# Patient Record
Sex: FEMALE | Race: WHITE | NOT HISPANIC OR LATINO | Employment: FULL TIME | URBAN - METROPOLITAN AREA
[De-identification: names, ages, dates, MRNs, and addresses within clinical notes are randomized per-mention and may not be internally consistent; named-entity substitution may affect disease eponyms.]

---

## 2020-02-04 ENCOUNTER — HOSPITAL ENCOUNTER (OUTPATIENT)
Dept: URGENT CARE | Facility: CLINIC | Age: 40
Discharge: HOME | End: 2020-02-04
Attending: PHYSICIAN ASSISTANT

## 2023-04-04 ENCOUNTER — OFFICE VISIT (OUTPATIENT)
Dept: INTERNAL MEDICINE | Facility: CLINIC | Age: 43
End: 2023-04-04
Payer: COMMERCIAL

## 2023-04-04 VITALS
OXYGEN SATURATION: 98 % | HEART RATE: 88 BPM | TEMPERATURE: 97.7 F | HEIGHT: 59 IN | WEIGHT: 164.6 LBS | SYSTOLIC BLOOD PRESSURE: 142 MMHG | BODY MASS INDEX: 33.18 KG/M2 | RESPIRATION RATE: 18 BRPM | DIASTOLIC BLOOD PRESSURE: 84 MMHG

## 2023-04-04 DIAGNOSIS — M77.12 EPICONDYLITIS, LATERAL, LEFT: ICD-10-CM

## 2023-04-04 DIAGNOSIS — E55.9 VITAMIN D DEFICIENCY: ICD-10-CM

## 2023-04-04 DIAGNOSIS — F41.9 ANXIETY: ICD-10-CM

## 2023-04-04 DIAGNOSIS — R20.0 BILATERAL HAND NUMBNESS: Primary | ICD-10-CM

## 2023-04-04 DIAGNOSIS — M50.30 DEGENERATIVE DISC DISEASE, CERVICAL: ICD-10-CM

## 2023-04-04 DIAGNOSIS — E66.9 OBESITY (BMI 30-39.9): ICD-10-CM

## 2023-04-04 DIAGNOSIS — R03.0 ELEVATED BLOOD PRESSURE READING: ICD-10-CM

## 2023-04-04 PROBLEM — M54.12 RADICULOPATHY OF CERVICAL REGION: Status: ACTIVE | Noted: 2023-04-04

## 2023-04-04 LAB
25(OH)D3 SERPL-MCNC: 13.2 NG/ML (ref 30–100)
ALBUMIN SERPL-MCNC: 4.5 G/DL (ref 3.5–5.2)
ALBUMIN/GLOB SERPL: 1.5 G/DL
ALP SERPL-CCNC: 83 U/L (ref 39–117)
ALT SERPL W P-5'-P-CCNC: 10 U/L (ref 1–33)
ANION GAP SERPL CALCULATED.3IONS-SCNC: 10 MMOL/L (ref 5–15)
AST SERPL-CCNC: 18 U/L (ref 1–32)
BASOPHILS # BLD AUTO: 0.04 10*3/MM3 (ref 0–0.2)
BASOPHILS NFR BLD AUTO: 0.5 % (ref 0–1.5)
BILIRUB SERPL-MCNC: 0.2 MG/DL (ref 0–1.2)
BUN SERPL-MCNC: 13 MG/DL (ref 6–20)
BUN/CREAT SERPL: 19.4 (ref 7–25)
CALCIUM SPEC-SCNC: 9.1 MG/DL (ref 8.6–10.5)
CHLORIDE SERPL-SCNC: 102 MMOL/L (ref 98–107)
CHOLEST SERPL-MCNC: 212 MG/DL (ref 0–200)
CO2 SERPL-SCNC: 26 MMOL/L (ref 22–29)
CREAT SERPL-MCNC: 0.67 MG/DL (ref 0.57–1)
DEPRECATED RDW RBC AUTO: 38.8 FL (ref 37–54)
EGFRCR SERPLBLD CKD-EPI 2021: 112.8 ML/MIN/1.73
EOSINOPHIL # BLD AUTO: 0.18 10*3/MM3 (ref 0–0.4)
EOSINOPHIL NFR BLD AUTO: 2.1 % (ref 0.3–6.2)
ERYTHROCYTE [DISTWIDTH] IN BLOOD BY AUTOMATED COUNT: 11.7 % (ref 12.3–15.4)
FOLATE SERPL-MCNC: 12.4 NG/ML (ref 4.78–24.2)
GLOBULIN UR ELPH-MCNC: 3 GM/DL
GLUCOSE SERPL-MCNC: 96 MG/DL (ref 65–99)
HCT VFR BLD AUTO: 38 % (ref 34–46.6)
HDLC SERPL-MCNC: 69 MG/DL (ref 40–60)
HGB BLD-MCNC: 12.9 G/DL (ref 12–15.9)
IMM GRANULOCYTES # BLD AUTO: 0.03 10*3/MM3 (ref 0–0.05)
IMM GRANULOCYTES NFR BLD AUTO: 0.3 % (ref 0–0.5)
LDLC SERPL CALC-MCNC: 131 MG/DL (ref 0–100)
LDLC/HDLC SERPL: 1.88 {RATIO}
LYMPHOCYTES # BLD AUTO: 2.68 10*3/MM3 (ref 0.7–3.1)
LYMPHOCYTES NFR BLD AUTO: 31.1 % (ref 19.6–45.3)
MCH RBC QN AUTO: 30.8 PG (ref 26.6–33)
MCHC RBC AUTO-ENTMCNC: 33.9 G/DL (ref 31.5–35.7)
MCV RBC AUTO: 90.7 FL (ref 79–97)
MONOCYTES # BLD AUTO: 0.61 10*3/MM3 (ref 0.1–0.9)
MONOCYTES NFR BLD AUTO: 7.1 % (ref 5–12)
NEUTROPHILS NFR BLD AUTO: 5.09 10*3/MM3 (ref 1.7–7)
NEUTROPHILS NFR BLD AUTO: 58.9 % (ref 42.7–76)
NRBC BLD AUTO-RTO: 0 /100 WBC (ref 0–0.2)
PLATELET # BLD AUTO: 281 10*3/MM3 (ref 140–450)
PMV BLD AUTO: 9.6 FL (ref 6–12)
POTASSIUM SERPL-SCNC: 4.5 MMOL/L (ref 3.5–5.2)
PROT SERPL-MCNC: 7.5 G/DL (ref 6–8.5)
RBC # BLD AUTO: 4.19 10*6/MM3 (ref 3.77–5.28)
SODIUM SERPL-SCNC: 138 MMOL/L (ref 136–145)
T3FREE SERPL-MCNC: 3.18 PG/ML (ref 2–4.4)
T4 FREE SERPL-MCNC: 1.21 NG/DL (ref 0.93–1.7)
TRIGL SERPL-MCNC: 67 MG/DL (ref 0–150)
TSH SERPL DL<=0.05 MIU/L-ACNC: 1.6 UIU/ML (ref 0.27–4.2)
VIT B12 BLD-MCNC: 447 PG/ML (ref 211–946)
VLDLC SERPL-MCNC: 12 MG/DL (ref 5–40)
WBC NRBC COR # BLD: 8.63 10*3/MM3 (ref 3.4–10.8)

## 2023-04-04 PROCEDURE — 80061 LIPID PANEL: CPT | Performed by: INTERNAL MEDICINE

## 2023-04-04 PROCEDURE — 82306 VITAMIN D 25 HYDROXY: CPT | Performed by: INTERNAL MEDICINE

## 2023-04-04 PROCEDURE — 84481 FREE ASSAY (FT-3): CPT | Performed by: INTERNAL MEDICINE

## 2023-04-04 PROCEDURE — 84439 ASSAY OF FREE THYROXINE: CPT | Performed by: INTERNAL MEDICINE

## 2023-04-04 PROCEDURE — 82607 VITAMIN B-12: CPT | Performed by: INTERNAL MEDICINE

## 2023-04-04 PROCEDURE — 82746 ASSAY OF FOLIC ACID SERUM: CPT | Performed by: INTERNAL MEDICINE

## 2023-04-04 PROCEDURE — 80050 GENERAL HEALTH PANEL: CPT | Performed by: INTERNAL MEDICINE

## 2023-04-04 NOTE — Clinical Note
Inform patient vitamin D levels are low.Low vitamin D can cause fatigue and usually obtained from sunlight.  Will start vitamin D tablets high-dose once a week for 2 months then take vitamin D3 2000 units daily.  Prescriptions were sent to her pharmacy

## 2023-04-04 NOTE — PROGRESS NOTES
CHIEF COMPLAINT  Edith Smalls presents to [facility] to Establish Care (42 year old female here today to establish care. States she was seen in Urgent Care due to elbow pain. States she was diagnosed with a bulging disc in her c spine. )     HPI  42-year-old female patient here to establish care.    Patient was seen at the urgent clinic March 10, 2023 for left elbow pain for the past 3 weeks prior to that encounter.-With pain going down the left arm described as tingling at times.-Diagnosis was of cervical radiculopathy and she was given prednisone 20 mg 2 tablets by mouth daily for 5 days along with Flexeril 10 mg 1 tablet 3 times a day as needed-patient is here to establish care and may need possible MRI/EMG nerve conduction study to evaluate.      Has had chronic numbness of both hand for 10 yrs-decreased stre  nght of both hands--elbow pain is new for past 3 weeks-works for [employer]- with repetitive movements of her hands-      X-rays at urgent clinic revealed moderate C6-7 degenerative disc disease on March 10, 2023, left elbow x-ray unremarkable.    PMH-DDD, with left arm pain-elev BP    SH-works for [employer]- with repetitive movements of her hands-since 10/2023--left the year prior-and worked for [employer]-,  -2 kids - has 3 kids and home off and on    Past History:  Allergies: Patient has no known allergies.   Medical History: has a past medical history of Anxiety, Bulging of cervical intervertebral disc, and Elbow pain, left.   Surgical History: has a past surgical history that includes  section.   Family History: family history is not on file.   Social History: reports that she quit smoking about 5 years ago. Her smoking use included cigarettes. She started smoking about 23 years ago. She has a 4.50 pack-year smoking history. She has never used smokeless tobacco. She reports that she does not currently use alcohol. She reports that she does not use  "drugs.  Social History     Social History Narrative    Lives with spouse    Eats at least 2 meals per day     Healthy sleep habits          Current Outpatient Medications:   •  ibuprofen (ADVIL,MOTRIN) 200 MG tablet, Take 1 tablet by mouth Every 6 (Six) Hours As Needed for Mild Pain., Disp: , Rfl:      OBJECTIVE  Vital Signs  Vitals:    04/04/23 1413   BP: 142/84   BP Location: Left arm   Patient Position: Sitting   Pulse: 88   Resp: 18   Temp: 97.7 °F (36.5 °C)   TempSrc: Temporal   SpO2: 98%   Weight: 74.7 kg (164 lb 9.6 oz)   Height: 149.9 cm (59\")      Body mass index is 33.25 kg/m².    Review of Systems -left arm paresthesias/radiculopathy    Physical Exam  Vitals and nursing note reviewed.   Constitutional:       Appearance: Normal appearance.   HENT:      Head: Normocephalic and atraumatic.   Cardiovascular:      Rate and Rhythm: Normal rate and regular rhythm.   Pulmonary:      Effort: Pulmonary effort is normal.      Breath sounds: Normal breath sounds.   Abdominal:      Palpations: Abdomen is soft.   Musculoskeletal:         General: Tenderness present.      Cervical back: Normal range of motion and neck supple.      Comments: Tender at left lateral epicondyle   Neurological:      General: No focal deficit present.      Mental Status: She is alert and oriented to person, place, and time.      Comments: +Tinel's sign         RESULTS REVIEW  No results found for: PROBNP, BNP          No results found for: TSH   No results found for: FREET4         XR Elbow 3+ View Left    Result Date: 3/10/2023    1. Negative study      Mo Portillo M.D.       Electronically Signed and Approved By: Mo Portillo M.D. on 3/10/2023 at 13:26             XR Spine Cervical 3 View    Result Date: 3/10/2023    1. Moderate C6-7 degenerative disc disease      Mo Portillo M.D.       Electronically Signed and Approved By: Mo Portillo M.D. on 3/10/2023 at 13:28                       ASSESSMENT & PLAN  Diagnoses and all orders for " this visit:    1. Bilateral hand numbness (Primary)  Comments:  Probable carpal tunnel syndrome use wrist splints nightly but needs further evaluation and we will do EMG NCV  Orders:  -     CBC w AUTO Differential; Future  -     Comprehensive metabolic panel; Future  -     Lipid panel; Future  -     TSH+Free T4; Future  -     T3, free; Future  -     Vitamin D 25 hydroxy; Future  -     Vitamin B12; Future  -     Ambulatory Referral to Neurology  -     EMG & Nerve Conduction Test; Future  -     Folate; Future  -     Folate  -     Vitamin B12  -     Vitamin D 25 hydroxy  -     T3, free  -     TSH+Free T4  -     Lipid panel  -     Comprehensive metabolic panel  -     CBC w AUTO Differential    2. 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 Degenerative disc disease, cervical  Comments:  no neck pain   Orders:  -     CBC w AUTO Differential; Future  -     Comprehensive metabolic panel; Future  -     Lipid panel; Future  -     TSH+Free T4; Future  -     T3, free; Future  -     Vitamin D 25 hydroxy; Future  -     Vitamin B12; Future  -     Ambulatory Referral to Neurology  -     EMG & Nerve Conduction Test; Future  -     Vitamin B12  -     Vitamin D 25 hydroxy  -     T3, free  -     TSH+Free T4  -     Lipid panel  -     Comprehensive metabolic panel  -     CBC w AUTO Differential    4.  Anxiety  Comments:  Declined medications at present-stable  Orders:  -     CBC w AUTO Differential; Future  -     Comprehensive metabolic panel; Future  -     Lipid panel; Future  -     TSH+Free T4; Future  -     T3, free; Future  -     Vitamin D 25 hydroxy; Future  -     Vitamin B12; Future  -     Ambulatory Referral to Neurology  -     EMG & Nerve Conduction Test; Future  -     Vitamin B12  -     Vitamin D 25 hydroxy  -     T3, free  -     TSH+Free T4  -     Lipid panel  -     Comprehensive metabolic panel  -     CBC w AUTO Differential    5. Elevated blood pressure reading  Comments:  Monitor blood pressure at home goal is less than 140/90 ideally 120/80 monitor BP and record bring in log at next visit-check lipids, TSH,cmp, tfts    6. Obesity (BMI 30-39.9)        BMI is >= 30 and <35. (Class 1 Obesity). The following options were offered after discussion;: weight loss educational material (shared in after visit summary), exercise counseling/recommendations and nutrition counseling/recommendations      Patient Instructions   Use. wrist splints every night  Decrease repetitive activities as much as possible  Do labs today  Refer to neurology for evaluation and EMG nerve conduction velocity test  Monitor blood pressure at home goal is less than 140/90 ideally 120/80 follow-up in 3 weeks with BP log     Addendum April 5, 2023  Labs reviewed and consistent with low vitamin D levels which may cause fatigue start high-dose vitamin D once a week for 2 months then take daily vitamin D3 2000 units    FOLLOW UP  Return in about 29 days (around 5/3/2023).  To check on carpal tunnel and left elbow epicondylitis and lab    Patient was given instructions and counseling regarding her condition or for health maintenance advice. Please see specific information pulled into the AVS if appropriate.

## 2023-04-04 NOTE — PATIENT INSTRUCTIONS
Use. wrist splints every night  Decrease repetitive activities as much as possible  Do labs today  Refer to neurology for evaluation and EMG nerve conduction velocity test  Monitor blood pressure at home goal is less than 140/90 ideally 120/80 follow-up in 3 weeks with BP log

## 2023-04-05 RX ORDER — ERGOCALCIFEROL 1.25 MG/1
50000 CAPSULE ORAL
Qty: 8 CAPSULE | Refills: 0 | Status: SHIPPED | OUTPATIENT
Start: 2023-04-05

## 2023-04-05 RX ORDER — MULTIVIT-MIN/IRON/FOLIC ACID/K 18-600-40
1 CAPSULE ORAL DAILY
Qty: 90 CAPSULE | Refills: 1 | Status: SHIPPED | OUTPATIENT
Start: 2023-04-05

## 2023-04-17 ENCOUNTER — TELEPHONE (OUTPATIENT)
Dept: INTERNAL MEDICINE | Facility: CLINIC | Age: 43
End: 2023-04-17
Payer: COMMERCIAL

## 2023-05-11 ENCOUNTER — OFFICE VISIT (OUTPATIENT)
Dept: INTERNAL MEDICINE | Facility: CLINIC | Age: 43
End: 2023-05-11
Payer: COMMERCIAL

## 2023-05-11 VITALS
DIASTOLIC BLOOD PRESSURE: 60 MMHG | WEIGHT: 166.2 LBS | OXYGEN SATURATION: 98 % | HEIGHT: 59 IN | SYSTOLIC BLOOD PRESSURE: 116 MMHG | BODY MASS INDEX: 33.51 KG/M2 | HEART RATE: 76 BPM | RESPIRATION RATE: 14 BRPM | TEMPERATURE: 97.8 F

## 2023-05-11 DIAGNOSIS — E66.9 OBESITY (BMI 30-39.9): ICD-10-CM

## 2023-05-11 DIAGNOSIS — G56.03 BILATERAL CARPAL TUNNEL SYNDROME: ICD-10-CM

## 2023-05-11 DIAGNOSIS — E55.9 VITAMIN D DEFICIENCY: ICD-10-CM

## 2023-05-11 DIAGNOSIS — K21.9 GASTROESOPHAGEAL REFLUX DISEASE WITHOUT ESOPHAGITIS: Primary | ICD-10-CM

## 2023-05-11 RX ORDER — SEMAGLUTIDE 0.5 MG/.5ML
0.5 INJECTION, SOLUTION SUBCUTANEOUS WEEKLY
Qty: 2 ML | Refills: 0 | Status: SHIPPED | OUTPATIENT
Start: 2023-05-11

## 2023-05-11 RX ORDER — SEMAGLUTIDE 0.25 MG/.5ML
0.25 INJECTION, SOLUTION SUBCUTANEOUS WEEKLY
Qty: 2 ML | Refills: 0 | Status: SHIPPED | OUTPATIENT
Start: 2023-05-11

## 2023-05-11 RX ORDER — ESOMEPRAZOLE MAGNESIUM 40 MG/1
40 CAPSULE, DELAYED RELEASE ORAL
Qty: 30 CAPSULE | Refills: 2 | Status: SHIPPED | OUTPATIENT
Start: 2023-05-11

## 2023-05-11 NOTE — PROGRESS NOTES
CHIEF COMPLAINT  Edith Smalls presents to [facility] for follow-up of labs results, Follow-up (41 yo female is here today for a follow up visit and to go over labs results. Patient is having acid reflux bad and has to buy nexium every 14 days. ), and Heartburn.    HPI  42-year-old patient here for follow-up of chronic problems and labs    Numbness better with wrist splints and elbows better-working at a different line-    Main concern is her reflux-had  relief with Nexium    Obesity-wants to lose weight needs meds    Patient Instructions on 4/4/2023   Use. wrist splints every night  Decrease repetitive activities as much as possible  Do labs today  Refer to neurology for evaluation and EMG nerve conduction velocity test  Monitor blood pressure at home goal is less than 140/90 ideally 120/80 follow-up in 3 weeks with BP log     Addendum April 5, 2023  Labs reviewed and consistent with low vitamin D levels which may cause fatigue start high-dose vitamin D once a week for 2 months then take daily vitamin D3 2000 units    For 4/4/23 visit note  Patient was seen at the urgent clinic March 10, 2023 for left elbow pain for the past 3 weeks prior to that encounter.-With pain going down the left arm described as tingling at times.-Diagnosis was of cervical radiculopathy and she was given prednisone 20 mg 2 tablets by mouth daily for 5 days along with Flexeril 10 mg 1 tablet 3 times a day as needed-patient is here to establish care and may need possible MRI/EMG nerve conduction study to evaluate.        Has had chronic numbness of both hand for 10 yrs-decreased stre  nght of both hands--elbow pain is new for past 3 weeks-works for [employer]- with repetitive movements of her hands-        X-rays at urgent clinic revealed moderate C6-7 degenerative disc disease on March 10, 2023, left elbow x-ray unremarkable.     PMH-DDD, with left arm pain-elev BP     SH-works for [employer]- with  "repetitive movements of her hands-since 10/2023--left the year prior-and worked for [employer]-,  2018-2 kids - has 3 kids and home off and on    Current Outpatient Medications:   •  ibuprofen (ADVIL,MOTRIN) 200 MG tablet, Take 1 tablet by mouth Every 6 (Six) Hours As Needed for Mild Pain., Disp: , Rfl:   •  vitamin D (ERGOCALCIFEROL) 1.25 MG (87298 UT) capsule capsule, Take 1 capsule by mouth Every 7 (Seven) Days. For 2 months then take vitamin D3 2000 units daily, Disp: 8 capsule, Rfl: 0  •  Vitamin D, Cholecalciferol, 50 MCG (2000 UT) capsule, Take 1 capsule by mouth Daily., Disp: 90 capsule, Rfl: 1  •  esomeprazole (nexIUM) 40 MG capsule, Take 1 capsule by mouth Every Morning Before Breakfast. For 2 months then every other day or as needed, Disp: 30 capsule, Rfl: 2  •  Semaglutide-Weight Management (Wegovy) 0.25 MG/0.5ML solution auto-injector, Inject 0.25 mg under the skin into the appropriate area as directed 1 (One) Time Per Week. For 4 weeks, Disp: 2 mL, Rfl: 0  •  Semaglutide-Weight Management (Wegovy) 0.5 MG/0.5ML solution auto-injector, Inject 0.5 mL under the skin into the appropriate area as directed 1 (One) Time Per Week. For 4 weeks-start after finished with the 0.25 mg dose, Disp: 2 mL, Rfl: 0   PFSH reviewed.      OBJECTIVE  Vital Signs  Vitals:    05/11/23 1543   BP: 116/60   BP Location: Left arm   Patient Position: Sitting   Cuff Size: Adult   Pulse: 76   Resp: 14   Temp: 97.8 °F (36.6 °C)   TempSrc: Skin   SpO2: 98%   Weight: 75.4 kg (166 lb 3.2 oz)   Height: 149.9 cm (59\")      Body mass index is 33.57 kg/m². PLQ=799 BMI=25    Physical Exam  Vitals and nursing note reviewed.   Constitutional:       Appearance: Normal appearance.   HENT:      Head: Normocephalic and atraumatic.   Cardiovascular:      Rate and Rhythm: Normal rate and regular rhythm.   Pulmonary:      Effort: Pulmonary effort is normal.      Breath sounds: Normal breath sounds.   Abdominal:      Palpations: Abdomen is " soft.   Musculoskeletal:      Cervical back: Normal range of motion and neck supple.   Neurological:      General: No focal deficit present.      Mental Status: She is alert and oriented to person, place, and time.          RESULTS REVIEW  No results found for: PROBNP, BNP  CMP        4/4/2023    15:16   CMP   Glucose 96     BUN 13     Creatinine 0.67     EGFR 112.8     Sodium 138     Potassium 4.5     Chloride 102     Calcium 9.1     Total Protein 7.5     Albumin 4.5     Globulin 3.0     Total Bilirubin 0.2     Alkaline Phosphatase 83     AST (SGOT) 18     ALT (SGPT) 10     Albumin/Globulin Ratio 1.5     BUN/Creatinine Ratio 19.4     Anion Gap 10.0       CBC w/diff        4/4/2023    15:16   CBC w/Diff   WBC 8.63     RBC 4.19     Hemoglobin 12.9     Hematocrit 38.0     MCV 90.7     MCH 30.8     MCHC 33.9     RDW 11.7     Platelets 281     Neutrophil Rel % 58.9     Immature Granulocyte Rel % 0.3     Lymphocyte Rel % 31.1     Monocyte Rel % 7.1     Eosinophil Rel % 2.1     Basophil Rel % 0.5        Lipid Panel        4/4/2023    15:16   Lipid Panel   Total Cholesterol 212     Triglycerides 67     HDL Cholesterol 69     VLDL Cholesterol 12     LDL Cholesterol  131     LDL/HDL Ratio 1.88        Lab Results   Component Value Date    TSH 1.600 04/04/2023      Lab Results   Component Value Date    FREET4 1.21 04/04/2023         Lab Results   Component Value Date    JOXQVIXL27 447 04/04/2023    YADM19BS 13.2 (L) 04/04/2023        XR Elbow 3+ View Left    Result Date: 3/10/2023    1. Negative study      Mo Portillo M.D.       Electronically Signed and Approved By: Mo Portillo M.D. on 3/10/2023 at 13:26             XR Spine Cervical 3 View    Result Date: 3/10/2023    1. Moderate C6-7 degenerative disc disease      Mo Portillo M.D.       Electronically Signed and Approved By: Mo Portillo M.D. on 3/10/2023 at 13:28                        ASSESSMENT & PLAN  Diagnoses and all orders for this visit:    1.  Gastroesophageal reflux disease without esophagitis (Primary)  -     esomeprazole (nexIUM) 40 MG capsule; Take 1 capsule by mouth Every Morning Before Breakfast. For 2 months then every other day or as needed  Dispense: 30 capsule; Refill: 2    2. Obesity (BMI 30-39.9)  Comments:  Cut back on fast foods-goes out daily-,down to one soda daily,cut down on candy-wants to lose weight  Assessment & Plan:  Discussed obesity and complications discussed diet and cutting back on fast foods from daily to once every 2 weeks or less.  Patient states she will cut down her to daily sodas and on candy.    Plan start Wegovy side effects discussed need to reach main dose of 2.4 mg subcu every week denies any history of medullary thyroid cancer or pancreatitis denies any alcohol use.  Cut back on portions increase exercise walk 30 minutes daily.  Ideal body weight is around 120 pounds BMI equals 25  Follow-up in 2 months or sooner if needed.    Orders:  -     Semaglutide-Weight Management (Wegovy) 0.25 MG/0.5ML solution auto-injector; Inject 0.25 mg under the skin into the appropriate area as directed 1 (One) Time Per Week. For 4 weeks  Dispense: 2 mL; Refill: 0  -     Semaglutide-Weight Management (Wegovy) 0.5 MG/0.5ML solution auto-injector; Inject 0.5 mL under the skin into the appropriate area as directed 1 (One) Time Per Week. For 4 weeks-start after finished with the 0.25 mg dose  Dispense: 2 mL; Refill: 0    3. Vitamin D deficiency  Comments:  Continue with high-dose vitamin D then daily vitamin D 2000 units check levels at next visit.    4. Bilateral carpal tunnel syndrome  Comments:  Better since she changed position at work.  Only needing wrist splints as needed has had decreased numbness since.  Does not feel needs neurology eval.    Patient Instructions   Prescription for Nexium 40 mg 1 tablet before sleeping given    REFlux precautions discussed  Use Nexium for about 2 months then use every other day and as needed  Try  Wegovy as directed-thanks discussed no history of pancreatitis or thyroid cancer    Check vitamin D levels at next visit.    BMI is >= 30 and <35. (Class 1 Obesity). The following options were offered after discussion;: weight loss educational material (shared in after visit summary), exercise counseling/recommendations and nutrition counseling/recommendations          FOLLOW UP  Return in about 2 months (around 7/11/2023).    Patient was given instructions and counseling regarding her condition or for health maintenance advice. Please see specific information pulled into the AVS if appropriate.

## 2023-05-11 NOTE — PATIENT INSTRUCTIONS
Prescription for Nexium 40 mg 1 tablet before sleeping given    REFlux precautions discussed  Use Nexium for about 2 months then use every other day and as needed  Try Wegovy as directed-thanks discussed no history of pancreatitis or thyroid cancer

## 2023-05-11 NOTE — ASSESSMENT & PLAN NOTE
Discussed obesity and complications discussed diet and cutting back on fast foods from daily to once every 2 weeks or less.  Patient states she will cut down her to daily sodas and on candy.    Plan start Wegovy side effects discussed need to reach main dose of 2.4 mg subcu every week denies any history of medullary thyroid cancer or pancreatitis denies any alcohol use.  Cut back on portions increase exercise walk 30 minutes daily.  Ideal body weight is around 120 pounds BMI equals 25  Follow-up in 2 months or sooner if needed.

## 2023-05-15 ENCOUNTER — TELEPHONE (OUTPATIENT)
Dept: INTERNAL MEDICINE | Facility: CLINIC | Age: 43
End: 2023-05-15
Payer: COMMERCIAL

## 2023-05-15 NOTE — TELEPHONE ENCOUNTER
I spoke with patient and she states that she was able to get a coupon that would allow her just to pay $25 for the Wegovy if she wants to pursue this as we had discussed earlier.  I had told her that if she wanted to we could give her the phentermine medication which is only for 3 months.  Patient prefers to try the Wegovy at this time.  Keep appointment as scheduled

## 2023-05-15 NOTE — TELEPHONE ENCOUNTER
Spoke with patient the phone who states she found a coupon for $25 online and wants to pursue the Wegovy as discussed.  Ointment as scheduled.

## 2023-05-17 ENCOUNTER — TELEPHONE (OUTPATIENT)
Dept: INTERNAL MEDICINE | Facility: CLINIC | Age: 43
End: 2023-05-17
Payer: COMMERCIAL

## 2023-05-24 ENCOUNTER — TELEPHONE (OUTPATIENT)
Dept: INTERNAL MEDICINE | Facility: CLINIC | Age: 43
End: 2023-05-24
Payer: COMMERCIAL

## 2023-05-24 DIAGNOSIS — E66.01 MORBID (SEVERE) OBESITY DUE TO EXCESS CALORIES: Primary | ICD-10-CM

## 2023-05-24 RX ORDER — PHENTERMINE HYDROCHLORIDE 37.5 MG/1
37.5 CAPSULE ORAL EVERY MORNING
Qty: 30 CAPSULE | Refills: 0 | Status: SHIPPED | OUTPATIENT
Start: 2023-05-24

## 2023-05-24 NOTE — TELEPHONE ENCOUNTER
Unable to get wegovy due to cost-interested in ozempic or phentemine--BMI=33-  Will give phentermine and see back in one month.

## 2024-03-07 ENCOUNTER — OFFICE VISIT (OUTPATIENT)
Dept: INTERNAL MEDICINE | Facility: CLINIC | Age: 44
End: 2024-03-07
Payer: COMMERCIAL

## 2024-03-07 VITALS
BODY MASS INDEX: 34.56 KG/M2 | DIASTOLIC BLOOD PRESSURE: 84 MMHG | WEIGHT: 171.4 LBS | HEIGHT: 59 IN | SYSTOLIC BLOOD PRESSURE: 125 MMHG | OXYGEN SATURATION: 98 % | TEMPERATURE: 98 F | HEART RATE: 99 BPM

## 2024-03-07 DIAGNOSIS — E66.9 OBESITY (BMI 30-39.9): ICD-10-CM

## 2024-03-07 DIAGNOSIS — I10 PRIMARY HYPERTENSION: ICD-10-CM

## 2024-03-07 DIAGNOSIS — K21.9 GASTROESOPHAGEAL REFLUX DISEASE WITHOUT ESOPHAGITIS: ICD-10-CM

## 2024-03-07 DIAGNOSIS — E55.9 VITAMIN D DEFICIENCY: ICD-10-CM

## 2024-03-07 DIAGNOSIS — G44.229 CHRONIC TENSION-TYPE HEADACHE, NOT INTRACTABLE: ICD-10-CM

## 2024-03-07 DIAGNOSIS — R10.10 PAIN OF UPPER ABDOMEN: ICD-10-CM

## 2024-03-07 LAB
BILIRUB BLD-MCNC: NEGATIVE MG/DL
CLARITY, POC: CLEAR
COLOR UR: YELLOW
EXPIRATION DATE: NORMAL
GLUCOSE UR STRIP-MCNC: NEGATIVE MG/DL
KETONES UR QL: NEGATIVE
LEUKOCYTE EST, POC: NEGATIVE
Lab: NORMAL
NITRITE UR-MCNC: NEGATIVE MG/ML
PH UR: 6 [PH] (ref 5–8)
PROT UR STRIP-MCNC: NEGATIVE MG/DL
RBC # UR STRIP: NEGATIVE /UL
SP GR UR: 1.02 (ref 1–1.03)
UROBILINOGEN UR QL: NORMAL

## 2024-03-07 PROCEDURE — 82607 VITAMIN B-12: CPT | Performed by: INTERNAL MEDICINE

## 2024-03-07 PROCEDURE — 82746 ASSAY OF FOLIC ACID SERUM: CPT | Performed by: INTERNAL MEDICINE

## 2024-03-07 PROCEDURE — 82306 VITAMIN D 25 HYDROXY: CPT | Performed by: INTERNAL MEDICINE

## 2024-03-07 PROCEDURE — 80050 GENERAL HEALTH PANEL: CPT | Performed by: INTERNAL MEDICINE

## 2024-03-07 PROCEDURE — 84439 ASSAY OF FREE THYROXINE: CPT | Performed by: INTERNAL MEDICINE

## 2024-03-07 PROCEDURE — 80061 LIPID PANEL: CPT | Performed by: INTERNAL MEDICINE

## 2024-03-07 PROCEDURE — 83735 ASSAY OF MAGNESIUM: CPT | Performed by: INTERNAL MEDICINE

## 2024-03-07 PROCEDURE — 83690 ASSAY OF LIPASE: CPT | Performed by: INTERNAL MEDICINE

## 2024-03-07 PROCEDURE — 84481 FREE ASSAY (FT-3): CPT | Performed by: INTERNAL MEDICINE

## 2024-03-07 PROCEDURE — 82150 ASSAY OF AMYLASE: CPT | Performed by: INTERNAL MEDICINE

## 2024-03-07 RX ORDER — PANTOPRAZOLE SODIUM 40 MG/1
40 TABLET, DELAYED RELEASE ORAL 2 TIMES DAILY
Qty: 60 TABLET | Refills: 0 | Status: SHIPPED | OUTPATIENT
Start: 2024-03-07

## 2024-03-07 NOTE — PATIENT INSTRUCTIONS
Abdominal pain may be from the pancreas gastritis-UA at clinic is negative for leukocytes and blood  Do labs , amylase lipase and check for H. pylori gastritis with urea breath test  Stop ibuprofen which could be worsening her abdominal pain  Changeto pantoprazole 40 mg twice a day stop Nexium  Start vitamin D 2000 units daily since very low in April 2023 was 13  Follow-up 2 weeks  If not better may need CT of the abdomen and GI consult  If symptoms worsen needs to go to the ER

## 2024-03-07 NOTE — ASSESSMENT & PLAN NOTE
Concerned about possible gastritis or pancreatitis or other abnormality.    Plan stop ibuprofen which could be worsening abdominal pain takes about 800 mg several times a week and at least 400 mg daily for years for headache  Changed to pantoprazole 40 mg twice a day  Check urea breath test for H. pylori gastritis and do labs check amylase and lipase and CBC    Try amitriptyline to help with her chronic headaches

## 2024-03-07 NOTE — PROGRESS NOTES
Answers submitted by the patient for this visit:  Primary Reason for Visit (Submitted on 2/29/2024)  What is the primary reason for your visit?: Abdominal Pain  Abdominal Pain Questionnaire (Submitted on 2/29/2024)  Chief Complaint: Abdominal pain  Chronicity: recurrent  Onset: 1 to 4 weeks ago  Onset quality: sudden  Frequency: daily  Episode duration: 3 Months  Progression since onset: unchanged  Pain location: LUQ, left flank  Pain - numeric: 7/10  Pain quality: burning  Radiates to: LUQ  anorexia: No  arthralgias: No  belching: No  constipation: No  diarrhea: No  dysuria: No  fever: No  flatus: No  frequency: No  hematochezia: No  hematuria: No  melena: No  myalgias: No  nausea: No  weight loss: No  vomiting: No  Aggravated by: being still, eating, movement, palpation  Relieved by: nothing  CHIEF COMPLAINT  Edith Smalls presents to [facility] for follow-up of Abdominal Pain (Pt is a 43 yr old female presenting to Internal Medicine for abdominal pain; Pt reports pain stated two weeks ago:pain atsrts in left upper quadrant, then will shoot through abdomen to the left mid upper back ; pain is a 9: pt reports she notices pain increases with activity; pt states pressure to area eases pain, and sitting eases pain; self treated with OTC Gut liner with mild relief /).    HPI    43-year-old patient with complaint of abdominal pain for the past 2 weeks --worse with eating,and lasts hours -rates pain at 9/10-last time was yesterday at work-started working out last year-- and restarted again 6 weeks ago  Using ibuprofen 200 mg  2x/week for HA-up to 400 mg for years   Former smoker - kilo 2018-1 PPD at most- since 2002    UA at clinic unremarkable with negative leukocytes negative nitrite and no blood.  GERD-was on PPI-not controlled-    Dad with pancreatitis-     Obesity -5 lb weight gain since last yr    Vitamin D deficiency-equals 13-- April 2023    Patient Instructions May 11, 2023    Prescription for Nexium 40 mg 1 tablet before sleeping given   REFlux precautions discussed  Use Nexium for about 2 months then use every other day and as needed  Try Wegovy as directed-SE discussed-no history of pancreatitis or thyroid cancer     Check vitamin D levels at next visit.    Last visit with me was May 11, 2023  here for follow-up of chronic problems and labs     Numbness better with wrist splints and elbows better-working at a different line-     Main concern is her reflux-had  relief with Nexium     Obesity-wants to lose weight needs meds     Patient Instructions on 4/4/2023   Use. wrist splints every night  Decrease repetitive activities as much as possible  Do labs today  Refer to neurology for evaluation and EMG nerve conduction velocity test  Monitor blood pressure at home goal is less than 140/90 ideally 120/80 follow-up in 3 weeks with BP log     Addendum April 5, 2023  Labs reviewed and consistent with low vitamin D levels which may cause fatigue start high-dose vitamin D once a week for 2 months then take daily vitamin D3 2000 units     For 4/4/23 visit note  Patient was seen at the urgent clinic March 10, 2023 for left elbow pain for the past 3 weeks prior to that encounter.-With pain going down the left arm described as tingling at times.-Diagnosis was of cervical radiculopathy and she was given prednisone 20 mg 2 tablets by mouth daily for 5 days along with Flexeril 10 mg 1 tablet 3 times a day as needed-patient is here to establish care and may need possible MRI/EMG nerve conduction study to evaluate.        Has had chronic numbness of both hand for 10 yrs-decreased stre  nght of both hands--elbow pain is new for past 3 weeks-works for [employer]- with repetitive movements of her hands-        X-rays at urgent clinic revealed moderate C6-7 degenerative disc disease on March 10, 2023, left elbow x-ray unremarkable.     PMH-DDD, with left arm pain-elev BP     SH-works for [employer]- with  "repetitive movements of her hands-since 10/2023--left the year prior-and worked for [employer]-,  2018-2 kids - has 3 kids and home off and on             Current Outpatient Medications:     ibuprofen (ADVIL,MOTRIN) 200 MG tablet, Take 1 tablet by mouth Every 6 (Six) Hours As Needed for Mild Pain., Disp: , Rfl:     pantoprazole (Protonix) 40 MG EC tablet, Take 1 tablet by mouth 2 (Two) Times a Day. Pt tolerating otc  nexium 40 mg daily which she is using, Disp: 60 tablet, Rfl: 0   PFSH reviewed.      OBJECTIVE  Vital Signs  Vitals:    03/07/24 1455 03/07/24 1500   BP: 149/98 125/84   BP Location: Left arm Left arm   Patient Position: Sitting Sitting   Cuff Size: Adult Adult   Pulse: 99    Temp: 98 °F (36.7 °C)    TempSrc: Infrared    SpO2: 98%    Weight: 77.7 kg (171 lb 6.4 oz)    Height: 149.9 cm (59.02\")       Body mass index is 34.6 kg/m².    Physical Exam  Vitals and nursing note reviewed.   Constitutional:       Appearance: Normal appearance.   HENT:      Head: Normocephalic and atraumatic.   Cardiovascular:      Rate and Rhythm: Normal rate and regular rhythm.   Pulmonary:      Effort: Pulmonary effort is normal.      Breath sounds: Normal breath sounds.   Abdominal:      Palpations: Abdomen is soft.   Musculoskeletal:      Cervical back: Normal range of motion and neck supple.   Neurological:      General: No focal deficit present.      Mental Status: She is alert and oriented to person, place, and time.          RESULTS REVIEW  No results found for: \"PROBNP\", \"BNP\"  CMP          4/4/2023    15:16   CMP   Glucose 96    BUN 13    Creatinine 0.67    EGFR 112.8    Sodium 138    Potassium 4.5    Chloride 102    Calcium 9.1    Total Protein 7.5    Albumin 4.5    Globulin 3.0    Total Bilirubin 0.2    Alkaline Phosphatase 83    AST (SGOT) 18    ALT (SGPT) 10    Albumin/Globulin Ratio 1.5    BUN/Creatinine Ratio 19.4    Anion Gap 10.0      CBC w/diff          4/4/2023    15:16   CBC w/Diff   WBC 8.63  "   RBC 4.19    Hemoglobin 12.9    Hematocrit 38.0    MCV 90.7    MCH 30.8    MCHC 33.9    RDW 11.7    Platelets 281    Neutrophil Rel % 58.9    Immature Granulocyte Rel % 0.3    Lymphocyte Rel % 31.1    Monocyte Rel % 7.1    Eosinophil Rel % 2.1    Basophil Rel % 0.5       Lipid Panel          4/4/2023    15:16   Lipid Panel   Total Cholesterol 212    Triglycerides 67    HDL Cholesterol 69    VLDL Cholesterol 12    LDL Cholesterol  131    LDL/HDL Ratio 1.88       Lab Results   Component Value Date    TSH 1.600 04/04/2023      Lab Results   Component Value Date    FREET4 1.21 04/04/2023         Lab Results   Component Value Date    [id number] 447 04/04/2023    XJCV38JF 13.2 (L) 04/04/2023        No Images in the past 120 days found..             ASSESSMENT & PLAN  Diagnoses and all orders for this visit:    1. Pain of upper abdomen (Primary)  Comments:  check labs-H pylori test-amylase and lipase-stop ibuprofen and exercises-made the CAT scan of the abdomen/EGD  Assessment & Plan:  Concerned about possible gastritis or pancreatitis or other abnormality.    Plan stop ibuprofen which could be worsening abdominal pain takes about 800 mg several times a week and at least 400 mg daily for years for headache  Changed to pantoprazole 40 mg twice a day  Check urea breath test for H. pylori gastritis and do labs check amylase and lipase and CBC    Try amitriptyline to help with her chronic headaches    Orders:  -     H. Pylori Breath Test - Breath, Lung; Future  -     Comprehensive Metabolic Panel; Future  -     Lipid Panel; Future  -     TSH+Free T4; Future  -     T3, Free; Future  -     Vitamin B12; Future  -     Folate; Future  -     Magnesium; Future  -     Vitamin D,25-Hydroxy; Future  -     CBC & Differential; Future  -     H. Pylori Breath Test - Breath, Lung; Future  -     Amylase; Future  -     Lipase; Future  -     Comprehensive Metabolic Panel  -     Lipid Panel  -     TSH+Free T4  -     T3, Free  -     Vitamin  B12  -     Folate  -     Magnesium  -     Vitamin D,25-Hydroxy  -     CBC & Differential  -     Amylase  -     Lipase    2. Primary hypertension  Comments:  not on meds but also with abdominal pain today-monito BP -goal < 130/80    3. Obesity (BMI 30-39.9)  Overview:  Cut back on fast foods-goes out daily-,down to one soda daily,cut down on candy-wants to lose weight    Orders:  -     H. Pylori Breath Test - Breath, Lung; Future  -     Comprehensive Metabolic Panel; Future  -     Lipid Panel; Future  -     TSH+Free T4; Future  -     T3, Free; Future  -     Vitamin B12; Future  -     Folate; Future  -     Magnesium; Future  -     Vitamin D,25-Hydroxy; Future  -     CBC & Differential; Future  -     H. Pylori Breath Test - Breath, Lung; Future  -     Amylase; Future  -     Lipase; Future  -     Comprehensive Metabolic Panel  -     Lipid Panel  -     TSH+Free T4  -     T3, Free  -     Vitamin B12  -     Folate  -     Magnesium  -     Vitamin D,25-Hydroxy  -     CBC & Differential  -     Amylase  -     Lipase    4. Gastroesophageal reflux disease without esophagitis  Comments:  Changed to pantoprazole 40 mg twice a day stop Nexium for now  Orders:  -     H. Pylori Breath Test - Breath, Lung; Future  -     Comprehensive Metabolic Panel; Future  -     Lipid Panel; Future  -     TSH+Free T4; Future  -     T3, Free; Future  -     Vitamin B12; Future  -     Folate; Future  -     Magnesium; Future  -     Vitamin D,25-Hydroxy; Future  -     CBC & Differential; Future  -     H. Pylori Breath Test - Breath, Lung; Future  -     Amylase; Future  -     Lipase; Future  -     pantoprazole (Protonix) 40 MG EC tablet; Take 1 tablet by mouth 2 (Two) Times a Day. Pt tolerating otc  nexium 40 mg daily which she is using  Dispense: 60 tablet; Refill: 0  -     Comprehensive Metabolic Panel  -     Lipid Panel  -     TSH+Free T4  -     T3, Free  -     Vitamin B12  -     Folate  -     Magnesium  -     Vitamin D,25-Hydroxy  -     CBC &  Differential  -     Amylase  -     Lipase    5. Vitamin D deficiency  -     H. Pylori Breath Test - Breath, Lung; Future  -     Comprehensive Metabolic Panel; Future  -     Lipid Panel; Future  -     TSH+Free T4; Future  -     T3, Free; Future  -     Vitamin B12; Future  -     Folate; Future  -     Magnesium; Future  -     Vitamin D,25-Hydroxy; Future  -     CBC & Differential; Future  -     H. Pylori Breath Test - Breath, Lung; Future  -     Amylase; Future  -     Lipase; Future  -     Comprehensive Metabolic Panel  -     Lipid Panel  -     TSH+Free T4  -     T3, Free  -     Vitamin B12  -     Folate  -     Magnesium  -     Vitamin D,25-Hydroxy  -     CBC & Differential  -     Amylase  -     Lipase    6. Chronic tension-type headache, not intractable  Comments:  exacerbated by neck pain-use tyl prn-avoid NSAIDS-pt has no problems sleeping-HA occurs 1-2x/week            Addendum 3/8/2024  Vitamin D levels very low equals 12 start high-dose vitamin D for 3 months then 2000 units daily    Patient Instructions   Abdominal pain may be from the pancreas gastritis-UA at clinic is negative for leukocytes and blood  Do labs , amylase lipase and check for H. pylori gastritis with urea breath test  Stop ibuprofen which could be worsening her abdominal pain  [place] pantoprazole 40 mg twice a day stop Nexium  Start vitamin D 2000 units daily since very low in April 2023 was 13  Follow-up 2 weeks  If not better may need CT of the abdomen and GI consult  If symptoms worsen needs to go to the ER     FOLLOW UP  Return in about 2 weeks (around 3/21/2024) for Annual physical, Recheck.    Patient was given instructions and counseling regarding her condition or for health maintenance advice. Please see specific information pulled into the AVS if appropriate.

## 2024-03-08 LAB
25(OH)D3 SERPL-MCNC: 12 NG/ML (ref 30–100)
ALBUMIN SERPL-MCNC: 4.3 G/DL (ref 3.5–5.2)
ALBUMIN/GLOB SERPL: 1.3 G/DL
ALP SERPL-CCNC: 77 U/L (ref 39–117)
ALT SERPL W P-5'-P-CCNC: 7 U/L (ref 1–33)
AMYLASE SERPL-CCNC: 36 U/L (ref 28–100)
ANION GAP SERPL CALCULATED.3IONS-SCNC: 11.8 MMOL/L (ref 5–15)
AST SERPL-CCNC: 19 U/L (ref 1–32)
BASOPHILS # BLD AUTO: 0.04 10*3/MM3 (ref 0–0.2)
BASOPHILS NFR BLD AUTO: 0.5 % (ref 0–1.5)
BILIRUB SERPL-MCNC: <0.2 MG/DL (ref 0–1.2)
BUN SERPL-MCNC: 14 MG/DL (ref 6–20)
BUN/CREAT SERPL: 18.7 (ref 7–25)
CALCIUM SPEC-SCNC: 9.2 MG/DL (ref 8.6–10.5)
CHLORIDE SERPL-SCNC: 102 MMOL/L (ref 98–107)
CHOLEST SERPL-MCNC: 205 MG/DL (ref 0–200)
CO2 SERPL-SCNC: 24.2 MMOL/L (ref 22–29)
CREAT SERPL-MCNC: 0.75 MG/DL (ref 0.57–1)
DEPRECATED RDW RBC AUTO: 38.8 FL (ref 37–54)
EGFRCR SERPLBLD CKD-EPI 2021: 102.1 ML/MIN/1.73
EOSINOPHIL # BLD AUTO: 0.28 10*3/MM3 (ref 0–0.4)
EOSINOPHIL NFR BLD AUTO: 3.2 % (ref 0.3–6.2)
ERYTHROCYTE [DISTWIDTH] IN BLOOD BY AUTOMATED COUNT: 11.9 % (ref 12.3–15.4)
FOLATE SERPL-MCNC: 10.6 NG/ML (ref 4.78–24.2)
GLOBULIN UR ELPH-MCNC: 3.2 GM/DL
GLUCOSE SERPL-MCNC: 149 MG/DL (ref 65–99)
HCT VFR BLD AUTO: 39.4 % (ref 34–46.6)
HDLC SERPL-MCNC: 53 MG/DL (ref 40–60)
HGB BLD-MCNC: 12.9 G/DL (ref 12–15.9)
IMM GRANULOCYTES # BLD AUTO: 0.02 10*3/MM3 (ref 0–0.05)
IMM GRANULOCYTES NFR BLD AUTO: 0.2 % (ref 0–0.5)
LDLC SERPL CALC-MCNC: 130 MG/DL (ref 0–100)
LDLC/HDLC SERPL: 2.4 {RATIO}
LIPASE SERPL-CCNC: 24 U/L (ref 13–60)
LYMPHOCYTES # BLD AUTO: 2.94 10*3/MM3 (ref 0.7–3.1)
LYMPHOCYTES NFR BLD AUTO: 33.4 % (ref 19.6–45.3)
MAGNESIUM SERPL-MCNC: 2.3 MG/DL (ref 1.6–2.6)
MCH RBC QN AUTO: 29.6 PG (ref 26.6–33)
MCHC RBC AUTO-ENTMCNC: 32.7 G/DL (ref 31.5–35.7)
MCV RBC AUTO: 90.4 FL (ref 79–97)
MONOCYTES # BLD AUTO: 0.63 10*3/MM3 (ref 0.1–0.9)
MONOCYTES NFR BLD AUTO: 7.2 % (ref 5–12)
NEUTROPHILS NFR BLD AUTO: 4.88 10*3/MM3 (ref 1.7–7)
NEUTROPHILS NFR BLD AUTO: 55.5 % (ref 42.7–76)
NRBC BLD AUTO-RTO: 0 /100 WBC (ref 0–0.2)
PLATELET # BLD AUTO: 328 10*3/MM3 (ref 140–450)
PMV BLD AUTO: 9.7 FL (ref 6–12)
POTASSIUM SERPL-SCNC: 4.3 MMOL/L (ref 3.5–5.2)
PROT SERPL-MCNC: 7.5 G/DL (ref 6–8.5)
RBC # BLD AUTO: 4.36 10*6/MM3 (ref 3.77–5.28)
SODIUM SERPL-SCNC: 138 MMOL/L (ref 136–145)
T3FREE SERPL-MCNC: 3.01 PG/ML (ref 2–4.4)
T4 FREE SERPL-MCNC: 1.19 NG/DL (ref 0.93–1.7)
TRIGL SERPL-MCNC: 124 MG/DL (ref 0–150)
TSH SERPL DL<=0.05 MIU/L-ACNC: 1.79 UIU/ML (ref 0.27–4.2)
VIT B12 BLD-MCNC: 480 PG/ML (ref 211–946)
VLDLC SERPL-MCNC: 22 MG/DL (ref 5–40)
WBC NRBC COR # BLD AUTO: 8.79 10*3/MM3 (ref 3.4–10.8)

## 2024-03-08 RX ORDER — MULTIVIT-MIN/IRON/FOLIC ACID/K 18-600-40
CAPSULE ORAL
Qty: 90 CAPSULE | Refills: 1 | Status: SHIPPED | OUTPATIENT
Start: 2024-03-08

## 2024-03-08 RX ORDER — ERGOCALCIFEROL 1.25 MG/1
50000 CAPSULE ORAL WEEKLY
Qty: 12 CAPSULE | Refills: 0 | Status: SHIPPED | OUTPATIENT
Start: 2024-03-08

## 2024-03-21 ENCOUNTER — OFFICE VISIT (OUTPATIENT)
Dept: INTERNAL MEDICINE | Age: 44
End: 2024-03-21
Payer: COMMERCIAL

## 2024-03-21 VITALS
HEIGHT: 59 IN | HEART RATE: 87 BPM | OXYGEN SATURATION: 98 % | DIASTOLIC BLOOD PRESSURE: 81 MMHG | SYSTOLIC BLOOD PRESSURE: 130 MMHG | WEIGHT: 174.4 LBS | BODY MASS INDEX: 35.16 KG/M2 | TEMPERATURE: 98 F

## 2024-03-21 DIAGNOSIS — Z00.00 ROUTINE HISTORY AND PHYSICAL EXAMINATION OF ADULT: Primary | ICD-10-CM

## 2024-03-21 DIAGNOSIS — K21.9 GASTROESOPHAGEAL REFLUX DISEASE WITHOUT ESOPHAGITIS: ICD-10-CM

## 2024-03-21 DIAGNOSIS — I10 PRIMARY HYPERTENSION: ICD-10-CM

## 2024-03-21 DIAGNOSIS — Z12.4 SCREENING FOR CERVICAL CANCER: ICD-10-CM

## 2024-03-21 DIAGNOSIS — E55.9 VITAMIN D DEFICIENCY: ICD-10-CM

## 2024-03-21 DIAGNOSIS — E66.01 MORBID OBESITY DUE TO EXCESS CALORIES: ICD-10-CM

## 2024-03-21 DIAGNOSIS — Z12.31 SCREENING MAMMOGRAM FOR BREAST CANCER: ICD-10-CM

## 2024-03-21 NOTE — ASSESSMENT & PLAN NOTE
Blood pressure elevated ideal is 120/80 goal is less than 130/80    Plan-follow a low-salt diet lose 5 to 10 pounds by next visit exercise and walk at least 30 minutes daily continue with lifestyle changes if can get stepdown for weight loss would benefit with her underlying high blood pressure.

## 2024-03-21 NOTE — PROGRESS NOTES
CHIEF COMPLAINT  Edith Smalls presents to [facility] INTER[facility] MEDICINE for follow-up of Annual Exam (Pt is a 43 yr old female presenting to Internal Medicine for an annual physical; Pt reports she would like to discuss weight loss medication options. ).    HPI    43-year-old patient here for physical and weight loss medication options    Records reviewed, meds reviewed in detail, labs reviewed with patient.  Plan of care is as follows below.    Cholesterol with  HDL 53 and cholesterol 205 triglycerides is 124, liver function tests are normal sugar was 149  Vitamin D low at 12      Abd pain resolved with PPI and off ibuprofen    Obesity- on zepbound and losing weight  Wants to try -unable to lose weight on own    Addendum 3/8/2024  Vitamin D levels very low equals 12 start high-dose vitamin D for 3 months then 2000 units daily     Patient Instructions March 7, 2024   Abdominal pain may be from the pancreas gastritis-UA at clinic is negative for leukocytes and blood  Do labs , amylase lipase and check for H. pylori gastritis with urea breath test  Stop ibuprofen which could be worsening her abdominal pain  Changeto pantoprazole 40 mg twice a day stop Nexium  Start vitamin D 2000 units daily since very low in April 2023 was 13  Follow-up 2 weeks  If not better may need CT of the abdomen and GI consult  If symptoms worsen needs to go to the ER     March 7, 2024 visit  patient with complaint of abdominal pain for the past 2 weeks --worse with eating,and lasts hours -rates pain at 9/10-last time was yesterday at work-started working out last year-- and restarted again 6 weeks ago  Using ibuprofen 200 mg  2x/week for HA-up to 400 mg for years   Former smoker - kilo 2018-1 PPD at most- since 2002     UA at clinic unremarkable with negative leukocytes negative nitrite and no blood.  GERD-was on PPI-not controlled-     Dad with pancreatitis-      Obesity -5 lb weight gain since last yr      Vitamin D deficiency-equals 13-- April 2023     Patient Instructions May 11, 2023   Prescription for Nexium 40 mg 1 tablet before sleeping given   REFlux precautions discussed  Use Nexium for about 2 months then use every other day and as needed  Try Wegovy as directed-SE discussed-no history of pancreatitis or thyroid cancer     Check vitamin D levels at next visit.     Last visit with me was May 11, 2023  here for follow-up of chronic problems and labs     Numbness better with wrist splints and elbows better-working at a different line-     Main concern is her reflux-had  relief with Nexium     Obesity-wants to lose weight needs meds     Patient Instructions on 4/4/2023   Use. wrist splints every night  Decrease repetitive activities as much as possible  Do labs today  Refer to neurology for evaluation and EMG nerve conduction velocity test  Monitor blood pressure at home goal is less than 140/90 ideally 120/80 follow-up in 3 weeks with BP log     Addendum April 5, 2023  Labs reviewed and consistent with low vitamin D levels which may cause fatigue start high-dose vitamin D once a week for 2 months then take daily vitamin D3 2000 units     For 4/4/23 visit note  Patient was seen at the urgent clinic March 10, 2023 for left elbow pain for the past 3 weeks prior to that encounter.-With pain going down the left arm described as tingling at times.-Diagnosis was of cervical radiculopathy and she was given prednisone 20 mg 2 tablets by mouth daily for 5 days along with Flexeril 10 mg 1 tablet 3 times a day as needed-patient is here to establish care and may need possible MRI/EMG nerve conduction study to evaluate.        Has had chronic numbness of both hand for 10 yrs-decreased stre  nght of both hands--elbow pain is new for past 3 weeks-works for [employer]- with repetitive movements of her hands-        X-rays at urgent clinic revealed moderate C6-7 degenerative disc disease on March 10, 2023, left elbow x-ray  "unremarkable.     PMH-DDD, with left arm pain-elev BP     SH-works for [employer]- with repetitive movements of her hands-since 10/2023--left the year prior-and worked for [employer]-,  2018-2 kids - has 3 kids and home off and on                 Current Outpatient Medications:     Cholecalciferol (Vitamin D) 50 MCG (2000 UT) capsule, One caps po daily, Disp: 90 capsule, Rfl: 1    ibuprofen (ADVIL,MOTRIN) 200 MG tablet, Take 1 tablet by mouth Every 6 (Six) Hours As Needed for Mild Pain., Disp: , Rfl:     pantoprazole (Protonix) 40 MG EC tablet, Take 1 tablet by mouth 2 (Two) Times a Day. Pt tolerating otc  nexium 40 mg daily which she is using, Disp: 60 tablet, Rfl: 0    vitamin D (ERGOCALCIFEROL) 1.25 MG (11573 UT) capsule capsule, Take 1 capsule by mouth 1 (One) Time Per Week., Disp: 12 capsule, Rfl: 0    Tirzepatide-Weight Management (ZEPBOUND) 2.5 MG/0.5ML solution auto-injector, Inject 0.5 mL under the skin into the appropriate area as directed 1 (One) Time Per Week., Disp: 2 mL, Rfl: 0    [START ON 4/21/2024] Tirzepatide-Weight Management (ZEPBOUND) 5 MG/0.5ML solution auto-injector, Inject 0.5 mL under the skin into the appropriate area as directed 1 (One) Time Per Week., Disp: 2 mL, Rfl: 1   PFSH reviewed.      OBJECTIVE  Vital Signs  Vitals:    03/21/24 1439   BP: 130/81   BP Location: Left arm   Patient Position: Sitting   Cuff Size: Adult   Pulse: 87   Temp: 98 °F (36.7 °C)   TempSrc: Infrared   SpO2: 98%   Weight: 79.1 kg (174 lb 6.4 oz)   Height: 149.9 cm (59.02\")      Body mass index is 35.21 kg/m².    Physical Exam  Vitals and nursing note reviewed.   Constitutional:       Appearance: Normal appearance. She is obese.   HENT:      Head: Normocephalic and atraumatic.   Cardiovascular:      Rate and Rhythm: Normal rate and regular rhythm.   Pulmonary:      Effort: Pulmonary effort is normal.      Breath sounds: Normal breath sounds.   Abdominal:      Palpations: Abdomen is soft.   Musculoskeletal: " "     Cervical back: Normal range of motion and neck supple.   Neurological:      General: No focal deficit present.      Mental Status: She is alert and oriented to person, place, and time.          RESULTS REVIEW  No results found for: \"PROBNP\", \"BNP\"  CMP          4/4/2023    15:16 3/7/2024    15:39   CMP   Glucose 96  149    BUN 13  14    Creatinine 0.67  0.75    EGFR 112.8  102.1    Sodium 138  138    Potassium 4.5  4.3    Chloride 102  102    Calcium 9.1  9.2    Total Protein 7.5  7.5    Albumin 4.5  4.3    Globulin 3.0  3.2    Total Bilirubin 0.2  <0.2    Alkaline Phosphatase 83  77    AST (SGOT) 18  19    ALT (SGPT) 10  7    Albumin/Globulin Ratio 1.5  1.3    BUN/Creatinine Ratio 19.4  18.7    Anion Gap 10.0  11.8      CBC w/diff          4/4/2023    15:16 3/7/2024    15:39   CBC w/Diff   WBC 8.63  8.79    RBC 4.19  4.36    Hemoglobin 12.9  12.9    Hematocrit 38.0  39.4    MCV 90.7  90.4    MCH 30.8  29.6    MCHC 33.9  32.7    RDW 11.7  11.9    Platelets 281  328    Neutrophil Rel % 58.9  55.5    Immature Granulocyte Rel % 0.3  0.2    Lymphocyte Rel % 31.1  33.4    Monocyte Rel % 7.1  7.2    Eosinophil Rel % 2.1  3.2    Basophil Rel % 0.5  0.5       Lipid Panel          4/4/2023    15:16 3/7/2024    15:39   Lipid Panel   Total Cholesterol 212  205    Triglycerides 67  124    HDL Cholesterol 69  53    VLDL Cholesterol 12  22    LDL Cholesterol  131  130    LDL/HDL Ratio 1.88  2.40       Lab Results   Component Value Date    TSH 1.790 03/07/2024    TSH 1.600 04/04/2023      Lab Results   Component Value Date    FREET4 1.19 03/07/2024    FREET4 1.21 04/04/2023         Lab Results   Component Value Date    SCRPGXZX43 480 03/07/2024    SJAQ50OC 12.0 (L) 03/07/2024    MG 2.3 03/07/2024        No Images in the past 120 days found..             ASSESSMENT & PLAN  Diagnoses and all orders for this visit:    1. Routine history and physical examination of adult (Primary)  Assessment & Plan:  Ages 40 to 64 " Counseling/Anticipatory Guidance Discussed: nutrition, physical activity, healthy weight, injury prevention, misuse of tobacco, alcohol and drugs, dental health, mental health, immunizations, screenings, self-breast exam, and use of seat belts.    Orders:  -     Mammo Screening Digital Tomosynthesis Bilateral With CAD; Future  -     Ambulatory Referral to Obstetrics / Gynecology  -     Tirzepatide-Weight Management (ZEPBOUND) 2.5 MG/0.5ML solution auto-injector; Inject 0.5 mL under the skin into the appropriate area as directed 1 (One) Time Per Week.  Dispense: 2 mL; Refill: 0  -     Tirzepatide-Weight Management (ZEPBOUND) 5 MG/0.5ML solution auto-injector; Inject 0.5 mL under the skin into the appropriate area as directed 1 (One) Time Per Week.  Dispense: 2 mL; Refill: 1  -     Comprehensive Metabolic Panel; Future  -     Lipid Panel; Future  -     CBC & Differential; Future  -     Vitamin B12; Future  -     Folate; Future  -     Vitamin D,25-Hydroxy; Future  -     Magnesium; Future  -     Microalbumin / Creatinine Urine Ratio - Urine, Clean Catch; Future  -     Hemoglobin A1c; Future  -     TSH+Free T4; Future  -     T3, Free; Future    2. Vitamin D deficiency  Comments:  take high dose [id number]  he vit D 2000 u daily -recheck at next visit  Orders:  -     Tirzepatide-Weight Management (ZEPBOUND) 2.5 MG/0.5ML solution auto-injector; Inject 0.5 mL under the skin into the appropriate area as directed 1 (One) Time Per Week.  Dispense: 2 mL; Refill: 0  -     Tirzepatide-Weight Management (ZEPBOUND) 5 MG/0.5ML solution auto-injector; Inject 0.5 mL under the skin into the appropriate area as directed 1 (One) Time Per Week.  Dispense: 2 mL; Refill: 1  -     Comprehensive Metabolic Panel; Future  -     Lipid Panel; Future  -     CBC & Differential; Future  -     Vitamin B12; Future  -     Folate; Future  -     Vitamin D,25-Hydroxy; Future  -     Magnesium; Future  -     Microalbumin / Creatinine Urine Ratio - Urine,  Clean Catch; Future  -     Hemoglobin A1c; Future  -     TSH+Free T4; Future  -     T3, Free; Future    3. Gastroesophageal reflux disease without esophagitis  Comments:  Take PPI  40mg BID for one month then one daily for 2-3 weeks then prn-Reflux precautions-acoid caffeine, sodas, sonja, licorice-spicy foods-keep HOB up  Orders:  -     Tirzepatide-Weight Management (ZEPBOUND) 2.5 MG/0.5ML solution auto-injector; Inject 0.5 mL under the skin into the appropriate area as directed 1 (One) Time Per Week.  Dispense: 2 mL; Refill: 0  -     Tirzepatide-Weight Management (ZEPBOUND) 5 MG/0.5ML solution auto-injector; Inject 0.5 mL under the skin into the appropriate area as directed 1 (One) Time Per Week.  Dispense: 2 mL; Refill: 1  -     Comprehensive Metabolic Panel; Future  -     Lipid Panel; Future  -     CBC & Differential; Future  -     Vitamin B12; Future  -     Folate; Future  -     Vitamin D,25-Hydroxy; Future  -     Magnesium; Future  -     Microalbumin / Creatinine Urine Ratio - Urine, Clean Catch; Future  -     Hemoglobin A1c; Future  -     TSH+Free T4; Future  -     T3, Free; Future    4. Morbid obesity due to excess calories  Assessment & Plan:  Patient's (Body mass index is 35.21 kg/m².) indicates that they are morbidly/severely obese (BMI > 40 or > 35 with obesity - related health condition) with health conditions that include hypertension, dyslipidemias, and osteoarthritis . Weight is worsening. BMI  is above average; BMI management plan is completed. We discussed low calorie, low carb based diet program, portion control, increasing exercise, joining a fitness center or start home based exercise program, Weight Watchers or other Commercial based weight reduction program, and pharmacologic options including try zepbound .     Orders:  -     Tirzepatide-Weight Management (ZEPBOUND) 2.5 MG/0.5ML solution auto-injector; Inject 0.5 mL under the skin into the appropriate area as directed 1 (One) Time Per Week.   Dispense: 2 mL; Refill: 0  -     Tirzepatide-Weight Management (ZEPBOUND) 5 MG/0.5ML solution auto-injector; Inject 0.5 mL under the skin into the appropriate area as directed 1 (One) Time Per Week.  Dispense: 2 mL; Refill: 1  -     Comprehensive Metabolic Panel; Future  -     Lipid Panel; Future  -     CBC & Differential; Future  -     Vitamin B12; Future  -     Folate; Future  -     Vitamin D,25-Hydroxy; Future  -     Magnesium; Future  -     Microalbumin / Creatinine Urine Ratio - Urine, Clean Catch; Future  -     Hemoglobin A1c; Future  -     TSH+Free T4; Future  -     T3, Free; Future    5. Primary hypertension  Assessment & Plan:  Blood pressure elevated ideal is 120/80 goal is less than 130/80    Plan-follow a low-salt diet lose 5 to 10 pounds by next visit exercise and walk at least 30 minutes daily continue with lifestyle changes if can get stepdown for weight loss would benefit with her underlying high blood pressure.    Orders:  -     Tirzepatide-Weight Management (ZEPBOUND) 2.5 MG/0.5ML solution auto-injector; Inject 0.5 mL under the skin into the appropriate area as directed 1 (One) Time Per Week.  Dispense: 2 mL; Refill: 0  -     Tirzepatide-Weight Management (ZEPBOUND) 5 MG/0.5ML solution auto-injector; Inject 0.5 mL under the skin into the appropriate area as directed 1 (One) Time Per Week.  Dispense: 2 mL; Refill: 1  -     Comprehensive Metabolic Panel; Future  -     Lipid Panel; Future  -     CBC & Differential; Future  -     Vitamin B12; Future  -     Folate; Future  -     Vitamin D,25-Hydroxy; Future  -     Magnesium; Future  -     Microalbumin / Creatinine Urine Ratio - Urine, Clean Catch; Future  -     Hemoglobin A1c; Future  -     TSH+Free T4; Future  -     T3, Free; Future    6. Screening mammogram for breast cancer  -     Mammo Screening Digital Tomosynthesis Bilateral With CAD; Future  -     Tirzepatide-Weight Management (ZEPBOUND) 2.5 MG/0.5ML solution auto-injector; Inject 0.5 mL under  the skin into the appropriate area as directed 1 (One) Time Per Week.  Dispense: 2 mL; Refill: 0  -     Tirzepatide-Weight Management (ZEPBOUND) 5 MG/0.5ML solution auto-injector; Inject 0.5 mL under the skin into the appropriate area as directed 1 (One) Time Per Week.  Dispense: 2 mL; Refill: 1  -     Comprehensive Metabolic Panel; Future  -     Lipid Panel; Future  -     CBC & Differential; Future  -     Vitamin B12; Future  -     Folate; Future  -     Vitamin D,25-Hydroxy; Future  -     Magnesium; Future  -     Microalbumin / Creatinine Urine Ratio - Urine, Clean Catch; Future  -     Hemoglobin A1c; Future  -     TSH+Free T4; Future  -     T3, Free; Future    7. Screening for cervical cancer  -     Ambulatory Referral to Obstetrics / Gynecology  -     Tirzepatide-Weight Management (ZEPBOUND) 2.5 MG/0.5ML solution auto-injector; Inject 0.5 mL under the skin into the appropriate area as directed 1 (One) Time Per Week.  Dispense: 2 mL; Refill: 0  -     Tirzepatide-Weight Management (ZEPBOUND) 5 MG/0.5ML solution auto-injector; Inject 0.5 mL under the skin into the appropriate area as directed 1 (One) Time Per Week.  Dispense: 2 mL; Refill: 1  -     Comprehensive Metabolic Panel; Future  -     Lipid Panel; Future  -     CBC & Differential; Future  -     Vitamin B12; Future  -     Folate; Future  -     Vitamin D,25-Hydroxy; Future  -     Magnesium; Future  -     Microalbumin / Creatinine Urine Ratio - Urine, Clean Catch; Future  -     Hemoglobin A1c; Future  -     TSH+Free T4; Future  -     T3, Free; Future    8. BMI 35.0-35.9,adult  -     Tirzepatide-Weight Management (ZEPBOUND) 2.5 MG/0.5ML solution auto-injector; Inject 0.5 mL under the skin into the appropriate area as directed 1 (One) Time Per Week.  Dispense: 2 mL; Refill: 0  -     Tirzepatide-Weight Management (ZEPBOUND) 5 MG/0.5ML solution auto-injector; Inject 0.5 mL under the skin into the appropriate area as directed 1 (One) Time Per Week.  Dispense: 2 mL;  Refill: 1  -     Comprehensive Metabolic Panel; Future  -     Lipid Panel; Future  -     CBC & Differential; Future  -     Vitamin B12; Future  -     Folate; Future  -     Vitamin D,25-Hydroxy; Future  -     Magnesium; Future  -     Microalbumin / Creatinine Urine Ratio - Urine, Clean Catch; Future  -     Hemoglobin A1c; Future  -     TSH+Free T4; Future  -     T3, Free; Future           Patient Instructions   Try zepbound  Do labs in 3 months then f/u   Low carb diet  Needs mamm and pap     FOLLOW UP  Return in about 3 months (around 6/21/2024), or if symptoms worsen or fail to improve, for Recheck.    Patient was given instructions and counseling regarding her condition or for health maintenance advice. Please see specific information pulled into the AVS if appropriate.

## 2024-03-21 NOTE — ASSESSMENT & PLAN NOTE
Patient's (Body mass index is 35.21 kg/m².) indicates that they are morbidly/severely obese (BMI > 40 or > 35 with obesity - related health condition) with health conditions that include hypertension, dyslipidemias, and osteoarthritis . Weight is worsening. BMI  is above average; BMI management plan is completed. We discussed low calorie, low carb based diet program, portion control, increasing exercise, joining a fitness center or start home based exercise program, Weight Watchers or other Commercial based weight reduction program, and pharmacologic options including try zepbound .

## 2024-03-21 NOTE — ASSESSMENT & PLAN NOTE
Ages 40 to 64 Counseling/Anticipatory Guidance Discussed: nutrition, physical activity, healthy weight, injury prevention, misuse of tobacco, alcohol and drugs, dental health, mental health, immunizations, screenings, self-breast exam, and use of seat belts.

## 2024-04-01 ENCOUNTER — TELEMEDICINE (OUTPATIENT)
Dept: INTERNAL MEDICINE | Age: 44
End: 2024-04-01
Payer: COMMERCIAL

## 2024-04-01 VITALS
SYSTOLIC BLOOD PRESSURE: 112 MMHG | HEART RATE: 72 BPM | DIASTOLIC BLOOD PRESSURE: 84 MMHG | WEIGHT: 174 LBS | TEMPERATURE: 97.7 F | BODY MASS INDEX: 35.08 KG/M2 | HEIGHT: 59 IN

## 2024-04-01 DIAGNOSIS — E66.01 MORBID (SEVERE) OBESITY DUE TO EXCESS CALORIES: Primary | ICD-10-CM

## 2024-04-01 NOTE — ASSESSMENT & PLAN NOTE
Patient's (Body mass index is 35.12 kg/m².) indicates that they are morbidly/severely obese (BMI > 40 or > 35 with obesity - related health condition) with health conditions that include hypertension, dyslipidemias, and osteoarthritis . Weight is worsening. BMI  is above average; BMI management plan is completed. We discussed low calorie, low carb based diet program, portion control, increasing exercise, joining a fitness center or start home based exercise program, Weight Watchers or other Commercial based weight reduction program, and pharmacologic options including try Qsymia-unable to get zepbound or other weight loss meds .

## 2024-04-01 NOTE — PROGRESS NOTES
This was an audio and video enabled telemedicine encounter. Patient consents to telehealth visit.    CHIEF COMPLAINT  Edith Smalls presents to [facility] INTERNAL MEDICINE for follow-up of Weight Loss (Pt is a 43 yr old female presenting to Internal Medicine to discuss alternative weight loss nmedication; Pt reports no further concerns and/or complaints. /).    HPI    42 yo pt here for weight loss alternative-    Obesity -  3/21/23- 174 lbs-  unable to get zepbound or any weight loss meds- wants Qsymia-  goal 140 lbs or less-   on zepbound for a month-  No success with diets /intermittent fasting  Goes to gym 3 d/week -1 hr daily    Had tubal in past-          3/21/24 visit  here for physical and weight loss medication options     Records reviewed, meds reviewed in detail, labs reviewed with patient.  Plan of care is as follows below.     Cholesterol with  HDL 53 and cholesterol 205 triglycerides is 124, liver function tests are normal sugar was 149  Vitamin D low at 12        Abd pain resolved with PPI and off ibuprofen     Obesity- on zepbound and losing weight  Wants to try -unable to lose weight on own     Addendum 3/8/2024  Vitamin D levels very low equals 12 start high-dose vitamin D for 3 months then 2000 units daily     Patient Instructions March 7, 2024   Abdominal pain may be from the pancreas gastritis-UA at clinic is negative for leukocytes and blood  Do labs , amylase lipase and check for H. pylori gastritis with urea breath test  Stop ibuprofen which could be worsening her abdominal pain  Changeto pantoprazole 40 mg twice a day stop Nexium  Start vitamin D 2000 units daily since very low in April 2023 was 13  Follow-up 2 weeks  If not better may need CT of the abdomen and GI consult  If symptoms worsen needs to go to the ER      March 7, 2024 visit  patient with complaint of abdominal pain for the past 2 weeks --worse with eating,and lasts hours -rates pain  at 9/10-last time was yesterday at work-started working out last year-- and restarted again 6 weeks ago  Using ibuprofen 200 mg  2x/week for HA-up to 400 mg for years   Former smoker - kilo 2018-1 PPD at most- since 2002     UA at clinic unremarkable with negative leukocytes negative nitrite and no blood.  GERD-was on PPI-not controlled-     Dad with pancreatitis-      Obesity -5 lb weight gain since last yr     Vitamin D deficiency-equals 13-- April 2023     Patient Instructions May 11, 2023   Prescription for Nexium 40 mg 1 tablet before sleeping given   REFlux precautions discussed  Use Nexium for about 2 months then use every other day and as needed  Try Wegovy as directed-SE discussed-no history of pancreatitis or thyroid cancer     Check vitamin D levels at next visit.     Last visit with me was May 11, 2023  here for follow-up of chronic problems and labs     Numbness better with wrist splints and elbows better-working at a different line-     Main concern is her reflux-had  relief with Nexium     Obesity-wants to lose weight needs meds     Patient Instructions on 4/4/2023   Use. wrist splints every night  Decrease repetitive activities as much as possible  Do labs today  Refer to neurology for evaluation and EMG nerve conduction velocity test  Monitor blood pressure at home goal is less than 140/90 ideally 120/80 follow-up in 3 weeks with BP log     Addendum April 5, 2023  Labs reviewed and consistent with low vitamin D levels which may cause fatigue start high-dose vitamin D once a week for 2 months then take daily vitamin D3 2000 units     For 4/4/23 visit note  Patient was seen at the urgent clinic March 10, 2023 for left elbow pain for the past 3 weeks prior to that encounter.-With pain going down the left arm described as tingling at times.-Diagnosis was of cervical radiculopathy and she was given prednisone 20 mg 2 tablets by mouth daily for 5 days along with Flexeril 10 mg 1 tablet 3 times a day as  "needed-patient is here to establish care and may need possible MRI/EMG nerve conduction study to evaluate.        Has had chronic numbness of both hand for 10 yrs-decreased stre  nght of both hands--elbow pain is new for past 3 weeks-works for [employer]- with repetitive movements of her hands-        X-rays at urgent clinic revealed moderate C6-7 degenerative disc disease on March 10, 2023, left elbow x-ray unremarkable.     PMH-DDD, with left arm pain-elev BP     SH-works for [employer]- with repetitive movements of her hands-since 10/2023--left the year prior-and worked for [employer]-,  2018-2 kids - has 3 kids and home off and on    PFSH reviewed.      OBJECTIVE  Vital Signs  Vitals:    04/01/24 1453   BP: 112/84   BP Location: Right arm   Patient Position: Sitting   Cuff Size: Adult   Pulse: 72   Temp: 97.7 °F (36.5 °C)   TempSrc: Infrared   Weight: 78.9 kg (174 lb)   Height: 149.9 cm (59.02\")      Body mass index is 35.12 kg/m².    Physical Exam     RESULTS REVIEW  No results found for: \"PROBNP\", \"BNP\"  CMP          3/7/2024    15:39   CMP   Glucose 149    BUN 14    Creatinine 0.75    EGFR 102.1    Sodium 138    Potassium 4.3    Chloride 102    Calcium 9.2    Total Protein 7.5    Albumin 4.3    Globulin 3.2    Total Bilirubin <0.2    Alkaline Phosphatase 77    AST (SGOT) 19    ALT (SGPT) 7    Albumin/Globulin Ratio 1.3    BUN/Creatinine Ratio 18.7    Anion Gap 11.8      CBC w/diff          3/7/2024    15:39   CBC w/Diff   WBC 8.79    RBC 4.36    Hemoglobin 12.9    Hematocrit 39.4    MCV 90.4    MCH 29.6    MCHC 32.7    RDW 11.9    Platelets 328    Neutrophil Rel % 55.5    Immature Granulocyte Rel % 0.2    Lymphocyte Rel % 33.4    Monocyte Rel % 7.2    Eosinophil Rel % 3.2    Basophil Rel % 0.5       Lipid Panel          3/7/2024    15:39   Lipid Panel   Total Cholesterol 205    Triglycerides 124    HDL Cholesterol 53    VLDL Cholesterol 22    LDL Cholesterol  130    LDL/HDL Ratio 2.40       Lab Results "   Component Value Date    TSH 1.790 03/07/2024    TSH 1.600 04/04/2023      Lab Results   Component Value Date    FREET4 1.19 03/07/2024    FREET4 1.21 04/04/2023         Lab Results   Component Value Date    WRMCCPKW62 480 03/07/2024    JQLY20OZ 12.0 (L) 03/07/2024    MG 2.3 03/07/2024        No Images in the past 120 days found..             ASSESSMENT & PLAN  Diagnoses and all orders for this visit:    1. Morbid (severe) obesity due to excess calories (Primary)  Assessment & Plan:  Patient's (Body mass index is 35.12 kg/m².) indicates that they are morbidly/severely obese (BMI > 40 or > 35 with obesity - related health condition) with health conditions that include hypertension, dyslipidemias, and osteoarthritis . Weight is worsening. BMI  is above average; BMI management plan is completed. We discussed low calorie, low carb based diet program, portion control, increasing exercise, joining a fitness center or start home based exercise program, Weight Watchers or other Commercial based weight reduction program, and pharmacologic options including try Qsymia-unable to get zepbound or other weight loss meds .              Patient Instructions   Patient very motivated and trying to lose weight wants to try Qsymia    Will see patient in 4 person and we will do blood pressure and urine drug screen at that time before prescribing Qsymia  Labs recently drawn a few weeks ago were reviewed and unremarkable  Continue to exercise/walk 30 minutes at least daily patient is active works at [employer]  Follow-up in a few weeks.     FOLLOW UP  No follow-ups on file.    Patient was given instructions and counseling regarding her condition or for health maintenance advice. Please see specific information pulled into the AVS if appropriate.

## 2024-04-01 NOTE — PROGRESS NOTES
CHIEF COMPLAINT  Edith Smalls presents to [facility] INTER[facility] MEDICINE for follow-up of Weight Loss (Pt is a 43 yr old female presenting to Internal Medicine to discuss alternative weight loss nmedication; Pt reports no further concerns and/or complaints. /).    HPI    42 yo pt here for f/u of weight loss/morbid obesity    Morbid obesity  -3/21/24- 174 lbs--insurance will not cover any weight loss meds-    3/21/24 visit  here for physical and weight loss medication options     Records reviewed, meds reviewed in detail, labs reviewed with patient.  Plan of care is as follows below.     Cholesterol with  HDL 53 and cholesterol 205 triglycerides is 124, liver function tests are normal sugar was 149  Vitamin D low at 12        Abd pain resolved with PPI and off ibuprofen     Obesity- on zepbound and losing weight  Wants to try -unable to lose weight on own     Addendum 3/8/2024  Vitamin D levels very low equals 12 start high-dose vitamin D for 3 months then 2000 units daily     Patient Instructions March 7, 2024   Abdominal pain may be from the pancreas gastritis-UA at clinic is negative for leukocytes and blood  Do labs , amylase lipase and check for H. pylori gastritis with urea breath test  Stop ibuprofen which could be worsening her abdominal pain  Changeto pantoprazole 40 mg twice a day stop Nexium  Start vitamin D 2000 units daily since very low in April 2023 was 13  Follow-up 2 weeks  If not better may need CT of the abdomen and GI consult  If symptoms worsen needs to go to the ER      March 7, 2024 visit  patient with complaint of abdominal pain for the past 2 weeks --worse with eating,and lasts hours -rates pain at 9/10-last time was yesterday at work-started working out last year-- and restarted again 6 weeks ago  Using ibuprofen 200 mg  2x/week for HA-up to 400 mg for years   Former smoker - kilo 2018-1 PPD at most- since 2002     UA at clinic unremarkable with negative  leukocytes negative nitrite and no blood.  GERD-was on PPI-not controlled-     Dad with pancreatitis-      Obesity -5 lb weight gain since last yr     Vitamin D deficiency-equals 13-- April 2023     Patient Instructions May 11, 2023   Prescription for Nexium 40 mg 1 tablet before sleeping given   REFlux precautions discussed  Use Nexium for about 2 months then use every other day and as needed  Try Wegovy as directed-SE discussed-no history of pancreatitis or thyroid cancer     Check vitamin D levels at next visit.     Last visit with me was May 11, 2023  here for follow-up of chronic problems and labs     Numbness better with wrist splints and elbows better-working at a different line-     Main concern is her reflux-had  relief with Nexium     Obesity-wants to lose weight needs meds     Patient Instructions on 4/4/2023   Use. wrist splints every night  Decrease repetitive activities as much as possible  Do labs today  Refer to neurology for evaluation and EMG nerve conduction velocity test  Monitor blood pressure at home goal is less than 140/90 ideally 120/80 follow-up in 3 weeks with BP log     Addendum April 5, 2023  Labs reviewed and consistent with low vitamin D levels which may cause fatigue start high-dose vitamin D once a week for 2 months then take daily vitamin D3 2000 units     For 4/4/23 visit note  Patient was seen at the urgent clinic March 10, 2023 for left elbow pain for the past 3 weeks prior to that encounter.-With pain going down the left arm described as tingling at times.-Diagnosis was of cervical radiculopathy and she was given prednisone 20 mg 2 tablets by mouth daily for 5 days along with Flexeril 10 mg 1 tablet 3 times a day as needed-patient is here to establish care and may need possible MRI/EMG nerve conduction study to evaluate.        Has had chronic numbness of both hand for 10 yrs-decreased stre  nght of both hands--elbow pain is new for past 3 weeks-works for [employer]- with  "repetitive movements of her hands-        X-rays at urgent clinic revealed moderate C6-7 degenerative disc disease on March 10, 2023, left elbow x-ray unremarkable.     PMH-DDD, with left arm pain-elev BP     SH-works for [employer]- with repetitive movements of her hands-since 10/2023--left the year prior-and worked for [employer]-,  2018-2 kids - has 3 kids and home off and on              Current Outpatient Medications:     Cholecalciferol (Vitamin D) 50 MCG (2000 UT) capsule, One caps po daily, Disp: 90 capsule, Rfl: 1    ibuprofen (ADVIL,MOTRIN) 200 MG tablet, Take 1 tablet by mouth Every 6 (Six) Hours As Needed for Mild Pain., Disp: , Rfl:     pantoprazole (Protonix) 40 MG EC tablet, Take 1 tablet by mouth 2 (Two) Times a Day. Pt tolerating otc  nexium 40 mg daily which she is using, Disp: 60 tablet, Rfl: 0    vitamin D (ERGOCALCIFEROL) 1.25 MG (17514 UT) capsule capsule, Take 1 capsule by mouth 1 (One) Time Per Week., Disp: 12 capsule, Rfl: 0    Tirzepatide-Weight Management (ZEPBOUND) 2.5 MG/0.5ML solution auto-injector, Inject 0.5 mL under the skin into the appropriate area as directed 1 (One) Time Per Week. (Patient not taking: Reported on 4/1/2024), Disp: 2 mL, Rfl: 0    [START ON 4/21/2024] Tirzepatide-Weight Management (ZEPBOUND) 5 MG/0.5ML solution auto-injector, Inject 0.5 mL under the skin into the appropriate area as directed 1 (One) Time Per Week. (Patient not taking: Reported on 4/1/2024), Disp: 2 mL, Rfl: 1   [facility] reviewed.      OBJECTIVE  Vital Signs  Vitals:    04/01/24 1453   BP: 112/84   BP Location: Right arm   Patient Position: Sitting   Cuff Size: Adult   Pulse: 72   Temp: 97.7 °F (36.5 °C)   TempSrc: Infrared   Weight: 78.9 kg (174 lb)   Height: 149.9 cm (59.02\")      Body mass index is 35.12 kg/m².    Physical Exam  Vitals and nursing note reviewed.   Constitutional:       Appearance: Normal appearance.   HENT:      Head: Normocephalic and atraumatic.   Cardiovascular:      Rate " "and Rhythm: Normal rate and regular rhythm.   Pulmonary:      Effort: Pulmonary effort is normal.      Breath sounds: Normal breath sounds.   Abdominal:      Palpations: Abdomen is soft.   Musculoskeletal:      Cervical back: Normal range of motion and neck supple.   Neurological:      General: No focal deficit present.      Mental Status: She is alert and oriented to person, place, and time.          RESULTS REVIEW  No results found for: \"PROBNP\", \"BNP\"  CMP          3/7/2024    15:39   CMP   Glucose 149    BUN 14    Creatinine 0.75    EGFR 102.1    Sodium 138    Potassium 4.3    Chloride 102    Calcium 9.2    Total Protein 7.5    Albumin 4.3    Globulin 3.2    Total Bilirubin <0.2    Alkaline Phosphatase 77    AST (SGOT) 19    ALT (SGPT) 7    Albumin/Globulin Ratio 1.3    BUN/Creatinine Ratio 18.7    Anion Gap 11.8      CBC w/diff          3/7/2024    15:39   CBC w/Diff   WBC 8.79    RBC 4.36    Hemoglobin 12.9    Hematocrit 39.4    MCV 90.4    MCH 29.6    MCHC 32.7    RDW 11.9    Platelets 328    Neutrophil Rel % 55.5    Immature Granulocyte Rel % 0.2    Lymphocyte Rel % 33.4    Monocyte Rel % 7.2    Eosinophil Rel % 3.2    Basophil Rel % 0.5       Lipid Panel          3/7/2024    15:39   Lipid Panel   Total Cholesterol 205    Triglycerides 124    HDL Cholesterol 53    VLDL Cholesterol 22    LDL Cholesterol  130    LDL/HDL Ratio 2.40       Lab Results   Component Value Date    TSH 1.790 03/07/2024    TSH 1.600 04/04/2023      Lab Results   Component Value Date    FREET4 1.19 03/07/2024    FREET4 1.21 04/04/2023         Lab Results   Component Value Date    [id number] 480 03/07/2024    WDZE78OK 12.0 (L) 03/07/2024    MG 2.3 03/07/2024        No Images in the past 120 days found..             ASSESSMENT & PLAN  Diagnoses and all orders for this visit:    1. Obesity (BMI 30-39.9) (Primary)  Overview:  Cut back on fast foods-goes out daily-,down to one soda daily,cut down on candy-wants to lose weight               "     There are no Patient Instructions on file for this visit.     FOLLOW UP  No follow-ups on file.    Patient was given instructions and counseling regarding her condition or for health maintenance advice. Please see specific information pulled into the AVS if appropriate.     {Optional Chart Navigation Links Wrapup  Review (Popup)  Advance Care Planning  Labs  CC  Problem List  Visit Diagnosis  Medications  Result Review  Imaging  Health Maintenance  Quality  BestPractice  SmartSets  SnapShot  Encounters  Notes  Media  Procedures :23}

## 2024-04-01 NOTE — PATIENT INSTRUCTIONS
Patient very motivated and trying to lose weight wants to try Qsymia    Will see patient in 4 person and we will do blood pressure and urine drug screen at that time before prescribing Qsymia  Labs recently drawn a few weeks ago were reviewed and unremarkable  Continue to exercise/walk 30 minutes at least daily patient is active works at [employer]  Follow-up in a few weeks.

## 2024-04-07 DIAGNOSIS — K21.9 GASTROESOPHAGEAL REFLUX DISEASE WITHOUT ESOPHAGITIS: ICD-10-CM

## 2024-04-08 ENCOUNTER — TELEPHONE (OUTPATIENT)
Dept: INTERNAL MEDICINE | Age: 44
End: 2024-04-08
Payer: COMMERCIAL

## 2024-04-08 RX ORDER — PANTOPRAZOLE SODIUM 40 MG/1
40 TABLET, DELAYED RELEASE ORAL 2 TIMES DAILY
Qty: 60 TABLET | Refills: 0 | Status: SHIPPED | OUTPATIENT
Start: 2024-04-08

## 2024-04-08 RX ORDER — ESOMEPRAZOLE MAGNESIUM 40 MG/1
40 CAPSULE, DELAYED RELEASE ORAL
Qty: 30 CAPSULE | Refills: 2 | OUTPATIENT
Start: 2024-04-08

## 2024-04-10 ENCOUNTER — HOSPITAL ENCOUNTER (OUTPATIENT)
Dept: MAMMOGRAPHY | Facility: HOSPITAL | Age: 44
Discharge: HOME | End: 2024-04-10
Admitting: INTERNAL MEDICINE
Payer: COMMERCIAL

## 2024-04-10 DIAGNOSIS — Z12.31 SCREENING MAMMOGRAM FOR BREAST CANCER: ICD-10-CM

## 2024-04-10 DIAGNOSIS — Z00.00 ROUTINE HISTORY AND PHYSICAL EXAMINATION OF ADULT: ICD-10-CM

## 2024-04-10 PROCEDURE — 77067 SCR MAMMO BI INCL CAD: CPT

## 2024-04-10 PROCEDURE — 77063 BREAST TOMOSYNTHESIS BI: CPT

## 2024-04-16 ENCOUNTER — TELEPHONE (OUTPATIENT)
Dept: INTERNAL MEDICINE | Age: 44
End: 2024-04-16
Payer: COMMERCIAL

## 2024-04-16 DIAGNOSIS — Z12.31 SCREENING MAMMOGRAM FOR BREAST CANCER: Primary | ICD-10-CM

## 2024-04-16 NOTE — TELEPHONE ENCOUNTER
Hub may relay message to patient      ----- Message from Rozina Lopez MD sent at 4/15/2024 10:11 PM EDT -----  Benign Mammogram, repeat in 1 year.   Orders placed for next year.

## 2024-04-16 NOTE — TELEPHONE ENCOUNTER
[facility] PROVIDED THE RELAY MESSAGE FROM THE OFFICE   PATIENT VOICED UNDERSTANDING AND HAS NO FURTHER QUESTIONS AT THIS TIME          Sabrina Zhao RegSched Rep     LK    4/16/24 11:07 AM  Note      Hub may relay message to patient        ----- Message from Rozina Lopez MD sent at 4/15/2024 10:11 PM EDT -----  Benign Mammogram, repeat in 1 year.   Orders placed for next year.          Statement Selected

## 2024-05-06 DIAGNOSIS — K21.9 GASTROESOPHAGEAL REFLUX DISEASE WITHOUT ESOPHAGITIS: ICD-10-CM

## 2024-05-06 RX ORDER — PANTOPRAZOLE SODIUM 40 MG/1
40 TABLET, DELAYED RELEASE ORAL 2 TIMES DAILY
Qty: 60 TABLET | Refills: 0 | Status: SHIPPED | OUTPATIENT
Start: 2024-05-06

## 2024-05-20 NOTE — PROGRESS NOTES
"Chief Complaint   Patient presents with    Gynecologic Exam       HPI:   43 y.o. . Presents for well woman exam. Contraception:  Tubal ligation  Menses:   q 1-3 month, lasts 3-4 days, changes super tampon q 1 hrs on heaviest days.   Pain:   moderate, tylenol improves , mild hot flashes, tolerable  Last pap: normal over 10 years ago per patient, records not available   Complaints: Pt has no complaints today.    Patient reports that she is not currently experiencing any symptoms of urinary incontinence.     Past Medical History:   Diagnosis Date    Anxiety     Bulging of cervical intervertebral disc     Elbow pain, left       Past Surgical History:   Procedure Laterality Date     SECTION      TUBAL ABDOMINAL LIGATION        Family History   Problem Relation Age of Onset    Anxiety disorder Father     Diabetes Father     Hyperlipidemia Father     Breast cancer Paternal Grandmother         age unknown    Uterine cancer Neg Hx     Colon cancer Neg Hx     Ovarian cancer Neg Hx      Allergies as of 2024 - Reviewed 2024   Allergen Reaction Noted    Omeprazole Hives 2023    Famotidine Rash 2023    Latex Hives and Itching 2023        PCP: does manage PMHx and preventative labs    /83   Pulse 87   Ht 149.9 cm (59.02\")   Wt 78 kg (172 lb)   LMP 2024 Comment: Tied tube  BMI 34.72 kg/m²     PHYSICAL EXAM: Chaperone present   General- NAD, alert and oriented, appropriate  Psych- Normal mood, good memory  Neck- No masses, no thyroid enlargement  Lymphatic- No palpable neck, axillary, or groin nodes  CV- Regular rhythm, no murmurs  Resp- CTA to bases, no wheezes  Abdomen- Soft, non distended, non tender, no masses  Breast left-  Bilaterally symmetrical, no masses, non tender, no nipple discharge  Breast right- Bilaterally symmetrical, no masses, non tender, no nipple discharge  External genitalia- Normal female, no lesions  Urethra/meatus- Normal, no masses, non tender, " no prolapse  Bladder- Normal, no masses, non tender, no prolapse  Vagina- Normal, no atrophy, no lesions, no discharge, no prolapse  Cvx- Normal, no lesions, no discharge, No cervical motion tenderness  Uterus- Normal size, shape & consistency.  Non tender, mobile.  Adnexa- No mass, non tender  Anus/Rectum/Perineum- Not performed  Ext- No edema, no cyanosis    Skin- No lesions, no rashes, no acanthosis nigricans    ASSESSMENT and PLAN:    Diagnoses and all orders for this visit:    1. Well woman exam (Primary)  -     IgP, Aptima HPV      Preventative:   BREAST HEALTH- Monthly self breast exam importance and how to reviewed. MMG and/or MRI (prn) reviewed per society guidelines and her individual history. Screening Imaging: Already up to date  CERVICAL CANCER Screening- Reviewed current ASCCP guidelines for screening w and wo cotest HPV, age specific.  Screen: Updated today  COLON CANCER Screening- Reviewed current medical society guidelines and options.  Screen:  Not medically needed  SEXUAL HEALTH: Declines STD screening  BONE HEALTH- Reviewed current medical society guidelines and options for testing, calcium and vit D intake.  Weight bearing exercise.  DEXA: Not medically needed  VACCINATIONS Recommended: Flu annually, Gardisil/HPV vaccine (up to 44yo)  Importance discussed, risk being unvaccinated reviewed.  Questions answered  Genetic testing: Does not qualify.  Smoking status- NON SMOKER  Follow up PCP/Specialist PMHx and Labs  TRACK MENSES, RTO if <q21d, >7d long, heavy or painful.      Follow Up:  Return in about 1 year (around 6/6/2025).        Lisa Minaya, APRN  06/06/2024

## 2024-05-31 DIAGNOSIS — E55.9 VITAMIN D DEFICIENCY: ICD-10-CM

## 2024-05-31 RX ORDER — ERGOCALCIFEROL 1.25 MG/1
50000 CAPSULE ORAL WEEKLY
Qty: 12 CAPSULE | Refills: 0 | Status: SHIPPED | OUTPATIENT
Start: 2024-05-31

## 2024-06-06 ENCOUNTER — OFFICE VISIT (OUTPATIENT)
Dept: OBSTETRICS AND GYNECOLOGY | Facility: CLINIC | Age: 44
End: 2024-06-06
Payer: COMMERCIAL

## 2024-06-06 VITALS
HEART RATE: 87 BPM | BODY MASS INDEX: 34.68 KG/M2 | SYSTOLIC BLOOD PRESSURE: 126 MMHG | DIASTOLIC BLOOD PRESSURE: 83 MMHG | HEIGHT: 59 IN | WEIGHT: 172 LBS

## 2024-06-06 DIAGNOSIS — Z01.419 WELL WOMAN EXAM: Primary | ICD-10-CM

## 2024-06-06 PROCEDURE — G0123 SCREEN CERV/VAG THIN LAYER: HCPCS | Performed by: NURSE PRACTITIONER

## 2024-06-06 PROCEDURE — 87624 HPV HI-RISK TYP POOLED RSLT: CPT | Performed by: NURSE PRACTITIONER

## 2024-06-11 LAB
CYTOLOGIST CVX/VAG CYTO: NORMAL
CYTOLOGY CVX/VAG DOC CYTO: NORMAL
CYTOLOGY CVX/VAG DOC THIN PREP: NORMAL
DX ICD CODE: NORMAL
HPV I/H RISK 4 DNA CVX QL PROBE+SIG AMP: NEGATIVE
Lab: NORMAL
OTHER STN SPEC: NORMAL
STAT OF ADQ CVX/VAG CYTO-IMP: NORMAL

## 2024-07-12 DIAGNOSIS — K21.9 GASTROESOPHAGEAL REFLUX DISEASE WITHOUT ESOPHAGITIS: ICD-10-CM

## 2024-07-12 RX ORDER — PANTOPRAZOLE SODIUM 40 MG/1
40 TABLET, DELAYED RELEASE ORAL 2 TIMES DAILY
Qty: 60 TABLET | Refills: 0 | Status: SHIPPED | OUTPATIENT
Start: 2024-07-12

## 2024-07-12 NOTE — TELEPHONE ENCOUNTER
Rx Refill Note  Requested Prescriptions     Pending Prescriptions Disp Refills    pantoprazole (PROTONIX) 40 MG EC tablet 60 tablet 0     Sig: Take 1 tablet by mouth 2 (Two) Times a Day.      Last office visit with prescribing clinician: 3/21/2024   Last telemedicine visit with prescribing clinician: 4/1/2024   Next office visit with prescribing clinician: Visit date not found                         Would you like a call back once the refill request has been completed: [] Yes [] No    If the office needs to give you a call back, can they leave a voicemail: [] Yes [] No    Ximena Dillon MA  07/12/24, 08:54 EDT

## 2024-09-03 DIAGNOSIS — K21.9 GASTROESOPHAGEAL REFLUX DISEASE WITHOUT ESOPHAGITIS: ICD-10-CM

## 2024-09-04 RX ORDER — PANTOPRAZOLE SODIUM 40 MG/1
40 TABLET, DELAYED RELEASE ORAL 2 TIMES DAILY
Qty: 60 TABLET | Refills: 0 | Status: SHIPPED | OUTPATIENT
Start: 2024-09-04

## 2024-10-20 DIAGNOSIS — K21.9 GASTROESOPHAGEAL REFLUX DISEASE WITHOUT ESOPHAGITIS: ICD-10-CM

## 2024-10-21 ENCOUNTER — TELEPHONE (OUTPATIENT)
Dept: INTERNAL MEDICINE | Age: 44
End: 2024-10-21
Payer: COMMERCIAL

## 2024-10-21 RX ORDER — PANTOPRAZOLE SODIUM 40 MG/1
40 TABLET, DELAYED RELEASE ORAL 2 TIMES DAILY
Qty: 60 TABLET | Refills: 0 | Status: SHIPPED | OUTPATIENT
Start: 2024-10-21 | End: 2024-10-21 | Stop reason: SINTOL

## 2024-10-21 NOTE — TELEPHONE ENCOUNTER
Requesting refill on pantoprazole 40 twice daily has not been seen since 6 months ago canceled appointment scheduled for June 2024    Needs follow-up appointment can use Pepcid over-the-counter twice a day 20 mg if needed in the meantime